# Patient Record
Sex: MALE | Race: WHITE | Employment: OTHER | URBAN - METROPOLITAN AREA
[De-identification: names, ages, dates, MRNs, and addresses within clinical notes are randomized per-mention and may not be internally consistent; named-entity substitution may affect disease eponyms.]

---

## 2017-03-02 DIAGNOSIS — E78.5 DYSLIPIDEMIA, GOAL LDL BELOW 70: Primary | ICD-10-CM

## 2017-03-02 DIAGNOSIS — I07.1 TRICUSPID VALVE INSUFFICIENCY, UNSPECIFIED ETIOLOGY: ICD-10-CM

## 2017-03-02 DIAGNOSIS — I25.10 ATHEROSCLEROSIS OF NATIVE CORONARY ARTERY OF NATIVE HEART WITHOUT ANGINA PECTORIS: ICD-10-CM

## 2017-03-02 DIAGNOSIS — R01.1 SYSTOLIC EJECTION MURMUR: ICD-10-CM

## 2017-03-08 ENCOUNTER — HOSPITAL ENCOUNTER (OUTPATIENT)
Dept: LAB | Age: 67
Discharge: HOME | End: 2017-03-08
Payer: MEDICARE

## 2017-03-08 PROCEDURE — 36415 COLL VENOUS BLD VENIPUNCTURE: CPT

## 2017-03-08 PROCEDURE — 83036 HEMOGLOBIN GLYCOSYLATED A1C: CPT

## 2017-03-08 PROCEDURE — 83704 LIPOPROTEIN BLD QUAN PART: CPT

## 2017-03-08 PROCEDURE — 80053 COMPREHEN METABOLIC PANEL: CPT

## 2017-03-08 PROCEDURE — 83735 ASSAY OF MAGNESIUM: CPT

## 2017-03-09 LAB
ALBUMIN SERPL-MCNC: 4.8 G/DL (ref 3.6–4.8)
ALBUMIN/GLOB SERPL: 2.1 {RATIO} (ref 1.1–2.5)
ALP SERPL-CCNC: 117 IU/L (ref 39–117)
ALT SERPL-CCNC: 24 IU/L (ref 0–44)
AST SERPL-CCNC: 26 IU/L (ref 0–40)
BILIRUB SERPL-MCNC: 0.5 MG/DL (ref 0–1.2)
BUN SERPL-MCNC: 13 MG/DL (ref 8–27)
BUN/CREAT SERPL: 12 (ref 10–22)
CALCIUM SERPL-MCNC: 9.4 MG/DL (ref 8.6–10.2)
CHLORIDE SERPL-SCNC: 103 MMOL/L (ref 96–106)
CHOLEST SERPL-MCNC: 174 MG/DL (ref 100–199)
CO2 SERPL-SCNC: 26 MMOL/L (ref 18–29)
CREAT SERPL-MCNC: 1.05 MG/DL (ref 0.76–1.27)
EST. AVERAGE GLUCOSE BLD GHB EST-MCNC: 108 MG/DL
GLOBULIN SER CALC-MCNC: 2.3 G/DL (ref 1.5–4.5)
GLUCOSE SERPL-MCNC: 99 MG/DL (ref 65–99)
HBA1C MFR BLD: 5.4 % (ref 4.8–5.6)
HDL SERPL-SCNC: 31 UMOL/L
HDLC SERPL-MCNC: 50 MG/DL
INTERPRETATION, 910389: NORMAL
LDL SERPL QN: 21.6 NM
LDL SERPL-SCNC: 1258 NMOL/L
LDL SMALL SERPL-SCNC: 344 NMOL/L
LDLC SERPL CALC-MCNC: 103 MG/DL (ref 0–99)
LP-IR SCORE SERPL: <25
MAGNESIUM SERPL-MCNC: 2 MG/DL (ref 1.6–2.3)
POTASSIUM SERPL-SCNC: 4.2 MMOL/L (ref 3.5–5.2)
PROT SERPL-MCNC: 7.1 G/DL (ref 6–8.5)
SODIUM SERPL-SCNC: 144 MMOL/L (ref 134–144)
TRIGL SERPL-MCNC: 104 MG/DL (ref 0–149)

## 2017-03-31 ENCOUNTER — OFFICE VISIT (OUTPATIENT)
Dept: CARDIOLOGY CLINIC | Age: 67
End: 2017-03-31

## 2017-03-31 VITALS
RESPIRATION RATE: 16 BRPM | WEIGHT: 153 LBS | HEIGHT: 68 IN | SYSTOLIC BLOOD PRESSURE: 124 MMHG | DIASTOLIC BLOOD PRESSURE: 62 MMHG | HEART RATE: 64 BPM | BODY MASS INDEX: 23.19 KG/M2

## 2017-03-31 DIAGNOSIS — I25.10 ATHEROSCLEROSIS OF NATIVE CORONARY ARTERY OF NATIVE HEART WITHOUT ANGINA PECTORIS: Primary | ICD-10-CM

## 2017-03-31 DIAGNOSIS — E78.5 DYSLIPIDEMIA, GOAL LDL BELOW 70: ICD-10-CM

## 2017-03-31 DIAGNOSIS — Z95.1 S/P CABG X 3: ICD-10-CM

## 2017-03-31 DIAGNOSIS — I07.1 TRICUSPID VALVE INSUFFICIENCY, UNSPECIFIED ETIOLOGY: ICD-10-CM

## 2017-03-31 NOTE — PROGRESS NOTES
[facility] [address]: Coletta Krabbe  [phone]  Pronounced \"sir-lakesha\"    HPI: Bruce Hughes, a 73y.o. year-old who presents for follow up regarding his CAD. He is feeling well and working out regularly. Lasts lipid a little worse, , keep working on it, would not change meds at this point, given risk of side effects with all of his other meds. Jogs 3 miles/day 6 days/week, no activity restrictions  Weight down 8 lbs but he is working on weight loss  -he has lost weight due to the stress of building the long-term home on [address]. He was having trouble with anxiety and depression and started Zoloft and that was awful for him, he could not eat and felt very poorly. The anxiety actually got much worse and is now of of it and starting to do better. Denies palpitations, chest pain, dyspnea  Still has lightheadedness when standing but not that often, denies syncope  No LE edema  He still feels very tender over the left side of his chest after his surgery  Retired but still doing some consulting  Still some after effects of aching in the chest postop. EKG is NST with anterior abnormalities    Assessment/Plan:  1. CAD- stable, 3 vessel CABG x 3 LIMA to LAD, RSVG to OM, RSVG to PDA 6/4/13 Dr. Max Hilton, on ASA and Livalo, no beta blocker secondary to dizziness/bradycardia, follow up in 3 months  2. Dyslipidemia- Lipids up a bit, continue Livalo and Zetia   - FDA recommends Livalvo (FDA recommendations scanned into [facility]) for lipid therapy with concurrent HIV therapy  3. NSTEMI- no ACEI/bblocker secondary to hypotension/dizziness, continue ASA and statin  4. HIV- followed by Dr. Elsa Russell, on Atripla   5. DVT- at GSV harvest site 7/13, gone on ultrasound after 6 months of coumadin therapy. 6. Borderline DM- last A1c under 6, followed by Dr. Sharonda Valle  7. Valvular heart disease- mild MR/TE/PI, followup echo for change in sx  8. Hypotension- stable now stay hydrated  9.  Dyslipidemia- discussed his ldlp of 1258 and low tg and ldl 103, discussed pcsk9 and will defer for now. 7/15 EF 60% mild TR/MR/PI  8/15 Nuc normal EF 60%  Venous duplex 7/10/13: DVT in GSV bilaterally, in the right extending into the CFV  CABG 6/4/13 - LIMA to LAD, RSVG to OM, RSVG to PDA  TTE 5/13 - LVEF 45 %, dyskinesis of the basal-mid anteroseptal and apical wall(s), trivial TR    Soc no tob no etoh  Fhx no early cad    He  has a past medical history of CAD (coronary artery disease); GERD (gastroesophageal reflux disease); Hiatal hernia; HIV (human immunodeficiency virus infection) ([facility] [address] and Hyperlipidemia. Cardiovascular ROS: negative for chest pain, dyspnea  Respiratory ROS: negative for - cough or hemoptysis  Neurological ROS: no TIA or stroke symptoms  All other systems negative except as above. PE  Vitals:    03/31/17 1127   BP: 124/62   Pulse: 64   Resp: 16   Weight: 153 lb (69.4 kg)   Height: 5' 8\" (1.727 m)    Body mass index is 23.26 kg/(m^2).    General appearance - alert, well appearing, and in no distress  Mental status - affect appropriate to mood  Eyes - sclera anicteric, moist mucous membranes  Neck - supple, no significant adenopathy  Lymphatics - not assessed   Chest - clear to auscultation, no wheezes, rales or rhonchi  Heart - normal rate, regular rhythm, normal S1, S2, 1-2/6 KESHAWN   Abdomen - soft, nontender, nondistended, no masses or organomegaly  Back exam - full range of motion, no tenderness  Neurological - cranial nerves II through XII grossly intact, no focal deficit  Musculoskeletal - no muscular tenderness noted, normal strength  Extremities - peripheral pulses normal, no pedal edema  Skin - normal coloration  no rashes    12 lead ECG: sinus bradycardia VR 53bpm     Recent Labs:  Lab Results   Component Value Date/Time    Cholesterol, total 176 01/11/2016 09:37 AM    Cholesterol, Total 174 03/08/2017 09:29 AM    HDL Cholesterol 47 01/11/2016 09:37 AM    LDL, calculated 103 01/11/2016 09:37 AM Triglyceride 130 01/11/2016 09:37 AM    CHOL/HDL Ratio 6.6 05/30/2013 04:00 AM     Lab Results   Component Value Date/Time    Creatinine 1.05 03/08/2017 09:29 AM     Lab Results   Component Value Date/Time    BUN 13 03/08/2017 09:29 AM     Lab Results   Component Value Date/Time    Potassium 4.2 03/08/2017 09:29 AM     Lab Results   Component Value Date/Time    Hemoglobin A1c 5.4 03/08/2017 09:29 AM     Lab Results   Component Value Date/Time    HGB 13.4 01/11/2016 09:37 AM     Lab Results   Component Value Date/Time    PLATELET 028 20/20/1320 09:37 AM       Reviewed:  Past Medical History:   Diagnosis Date    CAD (coronary artery disease)     GERD (gastroesophageal reflux disease)     Hiatal hernia     [facility] infection) ([facility] [address]     Hyperlipidemia      History   Smoking Status    Never Smoker   Smokeless Tobacco    Never Used     History   Alcohol Use    0.0 oz/week    0 Standard drinks or equivalent per week     Comment: Moderate use     No Known Allergies    Current Outpatient Prescriptions   Medication Sig    ZETIA 10 mg tablet TAKE 1 TABLET DAILY    LIVALO 4 mg tab tablet TAKE 1 TABLET DAILY    aspirin 81 mg chewable tablet Take 1 tablet by mouth daily.  fexofenadine (ALLEGRA) 180 mg tablet Take 180 mg by mouth daily as needed.  calcium 500 mg Tab Take 1 Tab by mouth daily.  albuterol (PROVENTIL HFA) 90 mcg/actuation inhaler Take 1 Puff by inhalation daily as needed.  cetirizine (ZYRTEC) 10 mg tablet Take  by mouth daily as needed.  efavirenz-emtrictabine-tenofovir (ATRIPLA) tablet Take 1 Tab by mouth nightly.  multivitamin (MULTI-DEYLIN) Liqd Take 5 mL by mouth daily. No current facility-administered medications for this visit.         [facility] [place]  [address], [address]

## 2017-03-31 NOTE — MR AVS SNAPSHOT
Visit Information Date & Time Provider Department Dept. Phone Encounter #  
 3/31/2017 11:00 AM Naman Dotson MD [facility] Bruce Lieberman [phone] [id number] Upcoming Health Maintenance Date Due Hepatitis C Screening 1950 DTaP/Tdap/Td series (1 - Tdap) 9/22/1972 FOBT Q 1 YEAR AGE 50-75 9/22/2001 ZOSTER VACCINE AGE 60> 9/22/2011 INFLUENZA AGE 9 TO ADULT 8/1/2016 GLAUCOMA SCREENING Q2Y 9/22/2016 Pneumococcal 65+ High/Highest Risk (2 of 2 - PPSV23) 9/22/2016 MEDICARE YEARLY EXAM 9/22/2016 Allergies as of 3/31/2017  Review Complete On: 3/31/2017 By: Ayush Sellers No Known Allergies Current Immunizations  Reviewed on 6/24/2013 Name Date Influenza Vaccine 10/1/2012 Pneumococcal Vaccine (Unspecified Type) 6/1/2006 Not reviewed this visit You Were Diagnosed With   
  
 Codes Comments Atherosclerosis of native coronary artery of native heart without angina pectoris    -  Primary ICD-10-CM: I25.10 ICD-9-CM: 414.01 Tricuspid valve insufficiency, unspecified etiology     ICD-10-CM: I07.1 ICD-9-CM: 397.0 Dyslipidemia, goal LDL below 70     ICD-10-CM: E78.5 ICD-9-CM: 272.4 S/P CABG x 3     ICD-10-CM: Z95.1 ICD-9-CM: V45.81 Vitals BP Pulse Resp Height(growth percentile) Weight(growth percentile) BMI  
 124/62 (BP 1 Location: Right arm, BP Patient Position: Sitting) 64 16 5' 8\" (1.727 m) 153 lb (69.4 kg) 23.26 kg/m2 Smoking Status Never Smoker Vitals History BMI and BSA Data Body Mass Index Body Surface Area  
 23.26 kg/m 2 1.82 m 2 Preferred Pharmacy Pharmacy Name Phone [facility] [phone] Your Updated Medication List  
  
   
This list is accurate as of: 3/31/17 11:51 AM.  Always use your most recent med list. ALLEGRA 180 mg tablet Generic drug:  fexofenadine Take 180 mg by mouth daily as needed. aspirin 81 mg chewable tablet Take 1 tablet by mouth daily. ATRIPLA tablet Generic drug:  efavirenz-emtrictabine-tenofovir Take 1 Tab by mouth nightly. calcium 500 mg Tab Take 1 Tab by mouth daily. cetirizine 10 mg tablet Commonly known as:  ZYRTEC Take  by mouth daily as needed. LIVALO 4 mg Tab tablet Generic drug:  pitavastatin TAKE 1 TABLET DAILY  
  
 multivitamin Liqd Commonly known as:  Unknown Born Take 5 mL by mouth daily. PROVENTIL HFA 90 mcg/actuation inhaler Generic drug:  albuterol Take 1 Puff by inhalation daily as needed. ZETIA 10 mg tablet Generic drug:  ezetimibe TAKE 1 TABLET DAILY Introducing [facility]! Dear Anuradha Duarte: Thank you for requesting a [employer] account. Our records indicate that you already have an active [employer] account. You can access your account anytime at [email or web address] Did you know that you can access your hospital and ER discharge instructions at any time in [employer]? You can also review all of your test results from your hospital stay or ER visit. Additional Information If you have questions, please visit the Frequently Asked Questions section of the [employer] website at [email or web address] Remember, [employer] is NOT to be used for urgent needs. For medical emergencies, dial 911. Now available from your iPhone and Android! Please provide this summary of care documentation to your next provider. Your primary care clinician is listed as Fran Gonzalez. If you have any questions after today's visit, please call [phone].

## 2017-07-11 RX ORDER — PITAVASTATIN CALCIUM 4.18 MG/1
TABLET, FILM COATED ORAL
Qty: 90 TAB | Refills: 2 | Status: SHIPPED | OUTPATIENT
Start: 2017-07-11 | End: 2018-03-31 | Stop reason: SDUPTHER

## 2017-11-03 ENCOUNTER — TELEPHONE (OUTPATIENT)
Dept: CARDIOLOGY CLINIC | Age: 67
End: 2017-11-03

## 2017-11-03 DIAGNOSIS — I25.10 ATHEROSCLEROSIS OF NATIVE CORONARY ARTERY OF NATIVE HEART WITHOUT ANGINA PECTORIS: Primary | ICD-10-CM

## 2017-11-03 DIAGNOSIS — E78.5 DYSLIPIDEMIA, GOAL LDL BELOW 70: ICD-10-CM

## 2017-11-03 DIAGNOSIS — R01.1 SYSTOLIC EJECTION MURMUR: ICD-10-CM

## 2017-11-03 NOTE — TELEPHONE ENCOUNTER
Pt asks that a new lab order be sent to his address of    [address] [place] [address] Chari MALDONADO, [address] [place] [address]      Thank you,  Jose Alberto Mitchell

## 2018-03-12 ENCOUNTER — HOSPITAL ENCOUNTER (OUTPATIENT)
Dept: LAB | Age: 68
Discharge: HOME | End: 2018-03-12
Payer: MEDICARE

## 2018-03-12 ENCOUNTER — TELEPHONE (OUTPATIENT)
Dept: CARDIOLOGY CLINIC | Age: 68
End: 2018-03-12

## 2018-03-12 PROCEDURE — 83735 ASSAY OF MAGNESIUM: CPT

## 2018-03-12 PROCEDURE — 83704 LIPOPROTEIN BLD QUAN PART: CPT

## 2018-03-12 PROCEDURE — 36415 COLL VENOUS BLD VENIPUNCTURE: CPT

## 2018-03-12 PROCEDURE — 83036 HEMOGLOBIN GLYCOSYLATED A1C: CPT

## 2018-03-12 PROCEDURE — 80053 COMPREHEN METABOLIC PANEL: CPT

## 2018-03-12 NOTE — TELEPHONE ENCOUNTER
Returned call to patient. Verified identity. Informed patient that his lab slip is ready to be picked up.

## 2018-03-12 NOTE — TELEPHONE ENCOUNTER
Patient would like to come by and  his paperwork for his labs. He stated that he is going to have them done today and misplaced the orders. Patient stated that he would like to come by and pick those up.   Phone [phone]

## 2018-03-13 LAB
ALBUMIN SERPL-MCNC: 4.3 G/DL (ref 3.6–4.8)
ALBUMIN/GLOB SERPL: 1.9 {RATIO} (ref 1.2–2.2)
ALP SERPL-CCNC: 103 IU/L (ref 39–117)
ALT SERPL-CCNC: 24 IU/L (ref 0–44)
AST SERPL-CCNC: 29 IU/L (ref 0–40)
BILIRUB SERPL-MCNC: 0.5 MG/DL (ref 0–1.2)
BUN SERPL-MCNC: 17 MG/DL (ref 8–27)
BUN/CREAT SERPL: 14 (ref 10–24)
CALCIUM SERPL-MCNC: 9.2 MG/DL (ref 8.6–10.2)
CHLORIDE SERPL-SCNC: 100 MMOL/L (ref 96–106)
CHOLEST SERPL-MCNC: 182 MG/DL (ref 100–199)
CO2 SERPL-SCNC: 25 MMOL/L (ref 18–29)
CREAT SERPL-MCNC: 1.19 MG/DL (ref 0.76–1.27)
EST. AVERAGE GLUCOSE BLD GHB EST-MCNC: 108 MG/DL
GFR SERPLBLD CREATININE-BSD FMLA CKD-EPI: 63 ML/MIN/1.73
GFR SERPLBLD CREATININE-BSD FMLA CKD-EPI: 73 ML/MIN/1.73
GLOBULIN SER CALC-MCNC: 2.3 G/DL (ref 1.5–4.5)
GLUCOSE SERPL-MCNC: 99 MG/DL (ref 65–99)
HBA1C MFR BLD: 5.4 % (ref 4.8–5.6)
HDL SERPL-SCNC: 29.6 UMOL/L
HDLC SERPL-MCNC: 51 MG/DL
INTERPRETATION, 910389: NORMAL
LDL SERPL QN: 21.8 NM
LDL SERPL-SCNC: 1326 NMOL/L
LDL SMALL SERPL-SCNC: 279 NMOL/L
LDLC SERPL CALC-MCNC: 109 MG/DL (ref 0–99)
LP-IR SCORE SERPL: <25
MAGNESIUM SERPL-MCNC: 2.4 MG/DL (ref 1.6–2.3)
POTASSIUM SERPL-SCNC: 4 MMOL/L (ref 3.5–5.2)
PROT SERPL-MCNC: 6.6 G/DL (ref 6–8.5)
SODIUM SERPL-SCNC: 140 MMOL/L (ref 134–144)
TRIGL SERPL-MCNC: 108 MG/DL (ref 0–149)

## 2018-03-20 ENCOUNTER — TELEPHONE (OUTPATIENT)
Dept: CARDIOLOGY CLINIC | Age: 68
End: 2018-03-20

## 2018-03-20 NOTE — TELEPHONE ENCOUNTER
Called patient. Verified identity. Informed him of the following information per Dara Bourne. NP. Advised patient to look into the medication and check with his insurance to see if it would be covered. Patient acknowledged understanding.

## 2018-03-20 NOTE — TELEPHONE ENCOUNTER
LDL and LDL-P is still too high with his hx of CAD. He has a follow up visit next month so let him know that we will be discussing Praluent injections for his cholesterol with him at that visit. If he sounds interested in pursuing Praluent injections please ask him to check with his insurance company regarding coverage of this medication and then we will discuss at his office visit next month.

## 2018-03-20 NOTE — TELEPHONE ENCOUNTER
----- Message from Chuck Brewster NP sent at 3/20/2018 10:58 AM EDT -----  LDL and LDL-P is still too high with his hx of CAD. He has a follow up visit next month so let him know that we will be discussing Praluent injections for his cholesterol with him at that visit. If he sounds interested in pursuing Praluent injections please ask him to check with his insurance company regarding coverage of this medication and then we will discuss at his office visit next month.

## 2018-04-03 RX ORDER — PITAVASTATIN CALCIUM 4.18 MG/1
TABLET, FILM COATED ORAL
Qty: 90 TAB | Refills: 2 | Status: SHIPPED | OUTPATIENT
Start: 2018-04-03 | End: 2018-12-30 | Stop reason: SDUPTHER

## 2018-04-12 ENCOUNTER — OFFICE VISIT (OUTPATIENT)
Dept: CARDIOLOGY CLINIC | Age: 68
End: 2018-04-12

## 2018-04-12 VITALS
WEIGHT: 150.4 LBS | HEIGHT: 68 IN | DIASTOLIC BLOOD PRESSURE: 74 MMHG | BODY MASS INDEX: 22.79 KG/M2 | HEART RATE: 58 BPM | SYSTOLIC BLOOD PRESSURE: 120 MMHG | RESPIRATION RATE: 16 BRPM

## 2018-04-12 DIAGNOSIS — I07.1 TRICUSPID VALVE INSUFFICIENCY, UNSPECIFIED ETIOLOGY: ICD-10-CM

## 2018-04-12 DIAGNOSIS — I34.0 NON-RHEUMATIC MITRAL REGURGITATION: ICD-10-CM

## 2018-04-12 DIAGNOSIS — E78.5 DYSLIPIDEMIA, GOAL LDL BELOW 70: ICD-10-CM

## 2018-04-12 DIAGNOSIS — Z95.1 S/P CABG X 3: ICD-10-CM

## 2018-04-12 DIAGNOSIS — I25.10 ATHEROSCLEROSIS OF NATIVE CORONARY ARTERY OF NATIVE HEART WITHOUT ANGINA PECTORIS: Primary | ICD-10-CM

## 2018-04-12 NOTE — PROGRESS NOTES
[facility] [address]: Burak Ortiz  [phone]  Pronounced \"sir-lakesha\"    HPI: Ranjan Monte, a 78y.o. year-old who presents for follow up regarding his CAD. 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 CAD- stable, 3 vessel CABG x 3 LIMA to LAD, RSVG to OM, RSVG to PDA 6/4/13 Dr. Nessa Finn, last stress test in 2015 so will proceed will stress test to assess for ischemia  -continue on ASA and Livalo, no beta blocker secondary to dizziness/bradycardia  -follow up in 1 year   2. Dyslipidemia- , LDL-P 1326, on Livalo 4mg dailly and Zetia 10mg daily   - FDA recommends Livalvo (FDA recommendations scanned into [facility]) for lipid therapy with concurrent HIV therapy  - discussed PCSK9 inhibitors but he is not willing to start a new medication at this time   3. NSTEMI- no ACEI/bblocker secondary to hypotension/dizziness, continue ASA and statin  4. HIV- followed by Dr. Yoana Venegas, on Atripla   5. DVT- at GSV harvest site 7/13, gone on ultrasound after 6 months of coumadin therapy. 6. Valvular heart disease- mild MR/TR/PI, will order TTE to reassess   7.  Hypotension- stable now stay hydrated    7/15 EF 60% mild TR/MR/PI  8/15 Nuc normal EF 60%  Venous duplex 7/10/13: DVT in GSV bilaterally, in the right extending into the CFV  CABG 6/4/13 - LIMA to LAD, RSVG to OM, RSVG to PDA  TTE 5/13 - LVEF 45 %, dyskinesis of the basal-mid anteroseptal and apical wall(s), trivial TR    Soc no tob no etoh  Fhx no early cad    He  has a past medical history of CAD (coronary artery disease); GERD (gastroesophageal reflux disease); Hiatal hernia; HIV (human immunodeficiency virus infection) ([facility] [address] and Hyperlipidemia. Cardiovascular ROS: negative for chest pain, dyspnea  Respiratory ROS: negative for - cough or hemoptysis  Neurological ROS: no TIA or stroke symptoms  All other systems negative except as above. PE  Vitals:    04/12/18 1130   BP: 120/74   Pulse: (!) 58   Resp: 16   Weight: 150 lb 6.4 oz (68.2 kg)   Height: 5' 8\" (1.727 m)    Body mass index is 22.87 kg/(m^2).    General appearance - alert, well appearing, and in no distress  Mental status - affect appropriate to mood  Eyes - sclera anicteric, moist mucous membranes  Neck - supple, no significant adenopathy  Lymphatics - not assessed   Chest - clear to auscultation, no wheezes, rales or rhonchi  Heart - normal rate, regular rhythm, normal S1, S2, 2/6 KESHAWN   Abdomen - soft, nontender, nondistended, no masses or organomegaly  Back exam - full range of motion, no tenderness  Neurological - cranial nerves II through XII grossly intact, no focal deficit  Musculoskeletal - no muscular tenderness noted, normal strength  Extremities - peripheral pulses normal, no pedal edema  Skin - normal coloration  no rashes    12 lead ECG: sinus bradycardia VR 58bpm     Recent Labs:  Lab Results   Component Value Date/Time    Cholesterol, total 176 01/11/2016 09:37 AM    Cholesterol, Total 182 03/12/2018 11:44 AM    HDL Cholesterol 47 01/11/2016 09:37 AM    LDL, calculated 103 (H) 01/11/2016 09:37 AM    Triglyceride 130 01/11/2016 09:37 AM    CHOL/HDL Ratio 6.6 (H) 05/30/2013 04:00 AM     Lab Results   Component Value Date/Time    Creatinine 1.19 03/12/2018 11:44 AM     Lab Results   Component Value Date/Time    BUN 17 03/12/2018 11:44 AM     Lab Results   Component Value Date/Time    Potassium 4.0 03/12/2018 11:44 AM     Lab Results   Component Value Date/Time    Hemoglobin A1c 5.4 03/12/2018 11:44 AM     Lab Results   Component Value Date/Time    HGB 13.4 01/11/2016 09:37 AM     Lab Results   Component Value Date/Time    PLATELET 465 21/41/1794 09:37 AM       Reviewed:  Past Medical History:   Diagnosis Date    CAD (coronary artery disease)     GERD (gastroesophageal reflux disease)     Hiatal hernia     [facility] infection) ([facility] [address]     Hyperlipidemia      History   Smoking Status    Never Smoker   Smokeless Tobacco    Never Used     History   Alcohol Use    0.0 oz/week    0 Standard drinks or equivalent per week     Comment: Moderate use     No Known Allergies    Current Outpatient Prescriptions   Medication Sig    LIVALO 4 mg tab tablet TAKE 1 TABLET DAILY    ezetimibe (ZETIA) 10 mg tablet TAKE 1 TABLET DAILY    aspirin 81 mg chewable tablet Take 1 tablet by mouth daily.  fexofenadine (ALLEGRA) 180 mg tablet Take 180 mg by mouth daily as needed.  calcium 500 mg Tab Take 1 Tab by mouth daily.  albuterol (PROVENTIL HFA) 90 mcg/actuation inhaler Take 1 Puff by inhalation daily as needed.  cetirizine (ZYRTEC) 10 mg tablet Take  by mouth daily as needed.  efavirenz-emtrictabine-tenofovir (ATRIPLA) tablet Take 1 Tab by mouth nightly.  multivitamin (MULTI-DEYLIN) Liqd Take 5 mL by mouth daily. No current facility-administered medications for this visit.       [employer] [facility] [place]  [address], [address]

## 2018-04-23 ENCOUNTER — CLINICAL SUPPORT (OUTPATIENT)
Dept: CARDIOLOGY CLINIC | Age: 68
End: 2018-04-23

## 2018-04-23 DIAGNOSIS — I25.10 ATHEROSCLEROSIS OF NATIVE CORONARY ARTERY OF NATIVE HEART WITHOUT ANGINA PECTORIS: Primary | ICD-10-CM

## 2018-04-23 DIAGNOSIS — Z95.1 S/P CABG X 3: ICD-10-CM

## 2018-04-23 DIAGNOSIS — R01.1 SYSTOLIC EJECTION MURMUR: ICD-10-CM

## 2018-04-23 DIAGNOSIS — I25.2 PERSONAL HISTORY OF MI (MYOCARDIAL INFARCTION): ICD-10-CM

## 2018-04-23 DIAGNOSIS — I36.1 NON-RHEUMATIC TRICUSPID VALVE INSUFFICIENCY: ICD-10-CM

## 2018-04-23 DIAGNOSIS — I34.0 NON-RHEUMATIC MITRAL REGURGITATION: ICD-10-CM

## 2018-04-23 DIAGNOSIS — I37.1 NONRHEUMATIC PULMONARY VALVE INSUFFICIENCY: Primary | ICD-10-CM

## 2018-04-23 DIAGNOSIS — E78.5 DYSLIPIDEMIA, GOAL LDL BELOW 70: ICD-10-CM

## 2018-04-23 DIAGNOSIS — Z95.1 HX OF CABG: ICD-10-CM

## 2018-04-23 DIAGNOSIS — I07.1 TRICUSPID VALVE INSUFFICIENCY, UNSPECIFIED ETIOLOGY: ICD-10-CM

## 2018-04-23 NOTE — PROGRESS NOTES
See scanned document. Ordering Doctor:Dr. Keven Ball  Reading Doctor:Dr. Keven Ball    Patient tolerated procedure well.

## 2018-04-30 ENCOUNTER — TELEPHONE (OUTPATIENT)
Dept: CARDIOLOGY CLINIC | Age: 68
End: 2018-04-30

## 2018-04-30 NOTE — TELEPHONE ENCOUNTER
----- Message from Anne Morgan MD sent at 4/26/2018  8:20 AM EDT -----  Needs echo to confirm EF, no new blockages      Nuclear: 4/18    Impression:   1.  Exercise capacity is excellent. 2.  Exercise treadmill test is normal at an optimal workload. 3.  Myocardial perfusion imaging is normal.   4.  Overall left ventricular systolic function is normal.   5.  Left ventricular systolic function is normal. LVEF 33%. , This low Ef is likley to be artifactual but needs to be verified with echo. 6.  Normal Exercise Gated SPECT myocardial perfusion study. These test results indicate low likelihood for the presence of angiographically significant CAD.         Echo: 4/18 EF 59%, WNL    LVM for patient to return call at earliest convenience. Patient returned call and above test results given.   2 pt identifiers used      Future Appointments  Date Time Provider Kae Spicer   4/25/2019 11:40 AM Anne Morgan  [address]

## 2018-12-31 RX ORDER — EZETIMIBE 10 MG/1
TABLET ORAL
Qty: 90 TAB | Refills: 3 | Status: SHIPPED | OUTPATIENT
Start: 2018-12-31 | End: 2020-02-26

## 2019-01-02 RX ORDER — PITAVASTATIN CALCIUM 4.18 MG/1
TABLET, FILM COATED ORAL
Qty: 90 TAB | Refills: 2 | Status: SHIPPED | OUTPATIENT
Start: 2019-01-02 | End: 2019-09-15 | Stop reason: SDUPTHER

## 2019-01-02 NOTE — TELEPHONE ENCOUNTER
Requested Prescriptions     Signed Prescriptions Disp Refills    LIVALO 4 mg tab tablet 90 Tab 2     Sig: TAKE 1 TABLET DAILY     Authorizing Provider: Felecia Friday     Ordering User: Quique Perez     Per verbal orders

## 2019-03-28 ENCOUNTER — PATIENT MESSAGE (OUTPATIENT)
Dept: CARDIOLOGY CLINIC | Age: 69
End: 2019-03-28

## 2019-03-29 DIAGNOSIS — E78.5 DYSLIPIDEMIA, GOAL LDL BELOW 70: ICD-10-CM

## 2019-03-29 DIAGNOSIS — I25.10 ATHEROSCLEROSIS OF NATIVE CORONARY ARTERY OF NATIVE HEART WITHOUT ANGINA PECTORIS: Primary | ICD-10-CM

## 2019-04-11 ENCOUNTER — HOSPITAL ENCOUNTER (OUTPATIENT)
Dept: LAB | Age: 69
Discharge: HOME | End: 2019-04-11
Payer: MEDICARE

## 2019-04-11 PROCEDURE — 85027 COMPLETE CBC AUTOMATED: CPT

## 2019-04-11 PROCEDURE — 36415 COLL VENOUS BLD VENIPUNCTURE: CPT

## 2019-04-11 PROCEDURE — 80053 COMPREHEN METABOLIC PANEL: CPT

## 2019-04-11 PROCEDURE — 83735 ASSAY OF MAGNESIUM: CPT

## 2019-04-11 PROCEDURE — 82306 VITAMIN D 25 HYDROXY: CPT

## 2019-04-11 PROCEDURE — 83704 LIPOPROTEIN BLD QUAN PART: CPT

## 2019-04-12 LAB
25(OH)D3+25(OH)D2 SERPL-MCNC: 29.5 NG/ML (ref 30–100)
ALBUMIN SERPL-MCNC: 4.8 G/DL (ref 3.6–4.8)
ALBUMIN/GLOB SERPL: 2.1 {RATIO} (ref 1.2–2.2)
ALP SERPL-CCNC: 124 IU/L (ref 39–117)
ALT SERPL-CCNC: 28 IU/L (ref 0–44)
AST SERPL-CCNC: 34 IU/L (ref 0–40)
BILIRUB SERPL-MCNC: 0.4 MG/DL (ref 0–1.2)
BUN SERPL-MCNC: 19 MG/DL (ref 8–27)
BUN/CREAT SERPL: 17 (ref 10–24)
CALCIUM SERPL-MCNC: 9.2 MG/DL (ref 8.6–10.2)
CHLORIDE SERPL-SCNC: 104 MMOL/L (ref 96–106)
CHOLEST SERPL-MCNC: 209 MG/DL (ref 100–199)
CO2 SERPL-SCNC: 26 MMOL/L (ref 20–29)
CREAT SERPL-MCNC: 1.14 MG/DL (ref 0.76–1.27)
ERYTHROCYTE [DISTWIDTH] IN BLOOD BY AUTOMATED COUNT: 12.9 % (ref 12.3–15.4)
GLOBULIN SER CALC-MCNC: 2.3 G/DL (ref 1.5–4.5)
GLUCOSE SERPL-MCNC: 94 MG/DL (ref 65–99)
HCT VFR BLD AUTO: 43.2 % (ref 37.5–51)
HDL SERPL-SCNC: 35.2 UMOL/L
HDLC SERPL-MCNC: 65 MG/DL
HGB BLD-MCNC: 14.1 G/DL (ref 13–17.7)
INTERPRETATION, 910389: NORMAL
LDL SERPL QN: 21.2 NM
LDL SERPL-SCNC: 1413 NMOL/L
LDL SMALL SERPL-SCNC: 542 NMOL/L
LDLC SERPL CALC-MCNC: 122 MG/DL (ref 0–99)
LP-IR SCORE SERPL: 32
MAGNESIUM SERPL-MCNC: 2.4 MG/DL (ref 1.6–2.3)
MCH RBC QN AUTO: 32.3 PG (ref 26.6–33)
MCHC RBC AUTO-ENTMCNC: 32.6 G/DL (ref 31.5–35.7)
MCV RBC AUTO: 99 FL (ref 79–97)
PLATELET # BLD AUTO: 191 X10E3/UL (ref 150–379)
POTASSIUM SERPL-SCNC: 4.6 MMOL/L (ref 3.5–5.2)
PROT SERPL-MCNC: 7.1 G/DL (ref 6–8.5)
RBC # BLD AUTO: 4.37 X10E6/UL (ref 4.14–5.8)
SODIUM SERPL-SCNC: 143 MMOL/L (ref 134–144)
TRIGL SERPL-MCNC: 108 MG/DL (ref 0–149)
WBC # BLD AUTO: 6.2 X10E3/UL (ref 3.4–10.8)

## 2019-04-25 ENCOUNTER — OFFICE VISIT (OUTPATIENT)
Dept: CARDIOLOGY CLINIC | Age: 69
End: 2019-04-25

## 2019-04-25 VITALS
HEIGHT: 68 IN | RESPIRATION RATE: 16 BRPM | WEIGHT: 156.4 LBS | OXYGEN SATURATION: 98 % | BODY MASS INDEX: 23.7 KG/M2 | HEART RATE: 68 BPM | DIASTOLIC BLOOD PRESSURE: 80 MMHG | SYSTOLIC BLOOD PRESSURE: 120 MMHG

## 2019-04-25 DIAGNOSIS — E78.5 DYSLIPIDEMIA, GOAL LDL BELOW 70: ICD-10-CM

## 2019-04-25 DIAGNOSIS — I25.10 ATHEROSCLEROSIS OF NATIVE CORONARY ARTERY OF NATIVE HEART WITHOUT ANGINA PECTORIS: ICD-10-CM

## 2019-04-25 DIAGNOSIS — Z95.1 HX OF CABG: ICD-10-CM

## 2019-04-25 DIAGNOSIS — I07.1 TRICUSPID VALVE INSUFFICIENCY, UNSPECIFIED ETIOLOGY: ICD-10-CM

## 2019-04-25 DIAGNOSIS — I25.119 ATHEROSCLEROSIS OF NATIVE CORONARY ARTERY OF NATIVE HEART WITH ANGINA PECTORIS (HCC): Primary | ICD-10-CM

## 2019-04-25 DIAGNOSIS — I36.1 NON-RHEUMATIC TRICUSPID VALVE INSUFFICIENCY: ICD-10-CM

## 2019-04-25 DIAGNOSIS — Z95.1 S/P CABG X 3: ICD-10-CM

## 2019-04-25 DIAGNOSIS — R01.1 SYSTOLIC EJECTION MURMUR: ICD-10-CM

## 2019-04-25 NOTE — PROGRESS NOTES
[facility] [address]: Yessenia Richards  [phone]  Pronounced \"sir-gent\"    HPI: Marc Rodríguez, a 80y.o. year-old who presents for follow up regarding his CAD. Just got labs and LDL is higher than it has been, still not at goal of LDL <70 and LDLp <1000. He is exercising daily, doing well, fells well and not chest pain and no dyspnea, eating ok but poor appetite and struggling with hs anxiety, etc. Going to therapy but still battling it. Used to take fish oil, changed to flaxseed and using that daily. So overall not much room for improvement in lifestyle and we discussed additional medicine considerations. He will look in to [address] [place] [address], [facility] cost and hopefully it is a possibility - it was not feasible last year due to cost.   Also consider Vascepa based on new outcomes data but one thing at a time. Denies chest pain or dyspnea with exertion  Walking daily, no activity limitations, energy level is good. Denies any palpitations  Only has dizziness with standing up too quickly, no syncope     He built a [facility] home on [facility], enjoying it. He is having trouble with anxiety and depression. Wokring with counseling and meds. No LE edema  Still with some chest discomfort postop. Assessment/Plan:  1. CAD- stable, 3 vessel CABG x 3 LIMA to LAD, RSVG to OM, RSVG to PDA 6/4/13 Dr. Lyle Dang, last stress test looked fine 2018  -continue on ASA and Livalo, no beta blocker secondary to dizziness/bradycardia  2. Dyslipidemia- , LDLp 1413 on Livalo 4mg dailly and Zetia 10mg daily   - FDA recommends Livalvo (FDA recommendations scanned into [facility]) for lipid therapy with concurrent HIV therapy  - discussed PCSK9 inhibitors, will research as above  3. NSTEMI- no ACEI/bblocker secondary to hypotension/dizziness, continue ASA and statin  4. HIV- followed by Dr. Donna Tovar, on Atripla, stable  5. DVT- at GS harvest site 7/13, gone on ultrasound after 6 months of coumadin therapy.    6. Valvular heart disease- mild MR/TR/PI, stable  7. Hypotension- stable now stay hydrated, etc    7/15 EF 60% mild TR/MR/PI  8/15 Nuc normal EF 60%  Venous duplex 7/10/13: DVT in GSV bilaterally, in the right extending into the CFV  CABG 6/4/13 - LIMA to LAD, RSVG to OM, RSVG to PDA  TTE 5/13 - LVEF 45 %, dyskinesis of the basal-mid anteroseptal and apical wall(s), trivial TR    Soc no tob no etoh  Fhx no early cad    He  has a past medical history of CAD (coronary artery disease), GERD (gastroesophageal reflux disease), Hiatal hernia, HIV (human immunodeficiency virus infection) ([address] and Hyperlipidemia. Cardiovascular ROS: negative for chest pain, dyspnea  Respiratory ROS: negative for - cough or hemoptysis  Neurological ROS: no TIA or stroke symptoms  All other systems negative except as above. PE  Vitals:    04/25/19 1150   Resp: 16   Weight: 156 lb 6.4 oz (70.9 kg)   Height: 5' 8\" (1.727 m)    Body mass index is 23.78 kg/m².    General appearance - alert, well appearing, and in no distress  Mental status - affect appropriate to mood  Eyes - sclera anicteric, moist mucous membranes  Neck - supple, no significant adenopathy  Lymphatics - not assessed   Chest - clear to auscultation, no wheezes, rales or rhonchi  Heart - normal rate, regular rhythm, normal S1, S2, 2/6 KESHAWN   Abdomen - soft, nontender, nondistended, no masses or organomegaly  Back exam - full range of motion, no tenderness  Neurological - cranial nerves II through XII grossly intact, no focal deficit  Musculoskeletal - no muscular tenderness noted, normal strength  Extremities - peripheral pulses normal, no pedal edema  Skin - normal coloration  no rashes    12 lead ECG: sinus bradycardia VR 58bpm     Recent Labs:  Lab Results   Component Value Date/Time    Cholesterol, total 176 01/11/2016 09:37 AM    Cholesterol, Total 209 (H) 04/11/2019 11:04 AM    HDL Cholesterol 47 01/11/2016 09:37 AM    LDL, calculated 103 (H) 01/11/2016 09:37 AM    Triglyceride 130 01/11/2016 09:37 AM    CHOL/HDL Ratio 6.6 (H) 05/30/2013 04:00 AM     Lab Results   Component Value Date/Time    Creatinine 1.14 04/11/2019 11:04 AM     Lab Results   Component Value Date/Time    BUN 19 04/11/2019 11:04 AM     Lab Results   Component Value Date/Time    Potassium 4.6 04/11/2019 11:04 AM     Lab Results   Component Value Date/Time    Hemoglobin A1c 5.4 03/12/2018 11:44 AM     Lab Results   Component Value Date/Time    HGB 14.1 04/11/2019 11:04 AM     Lab Results   Component Value Date/Time    PLATELET 658 11/82/1078 11:04 AM       Reviewed:  Past Medical History:   Diagnosis Date    CAD (coronary artery disease)     GERD (gastroesophageal reflux disease)     Hiatal hernia     [facility] infection) ([facility] [address]     Hyperlipidemia      Social History     Tobacco Use   Smoking Status Never Smoker   Smokeless Tobacco Never Used     Social History     Substance and Sexual Activity   Alcohol Use Yes    Alcohol/week: 0.0 oz    Frequency: 4 or more times a week    Drinks per session: 1 or 2    Comment: Moderate use     No Known Allergies    Current Outpatient Medications   Medication Sig    LIVALO 4 mg tab tablet TAKE 1 TABLET DAILY    ezetimibe (ZETIA) 10 mg tablet TAKE 1 TABLET DAILY    aspirin 81 mg chewable tablet Take 1 tablet by mouth daily.  fexofenadine (ALLEGRA) 180 mg tablet Take 180 mg by mouth daily as needed.  calcium 500 mg Tab Take 1 Tab by mouth daily.  albuterol (PROVENTIL HFA) 90 mcg/actuation inhaler Take 1 Puff by inhalation daily as needed.  cetirizine (ZYRTEC) 10 mg tablet Take  by mouth daily as needed.  efavirenz-emtrictabine-tenofovir (ATRIPLA) tablet Take 1 Tab by mouth nightly.  multivitamin (MULTI-DEYLIN) Liqd Take 5 mL by mouth daily. No current facility-administered medications for this visit.       [facility] [place]  [address], [address], [address] [place] [address]

## 2019-09-16 RX ORDER — PITAVASTATIN CALCIUM 4.18 MG/1
TABLET, FILM COATED ORAL
Qty: 90 TAB | Refills: 4 | Status: SHIPPED | OUTPATIENT
Start: 2019-09-16 | End: 2020-10-03

## 2020-04-30 ENCOUNTER — PATIENT MESSAGE (OUTPATIENT)
Dept: CARDIOLOGY CLINIC | Age: 70
End: 2020-04-30

## 2020-04-30 ENCOUNTER — VIRTUAL VISIT (OUTPATIENT)
Dept: CARDIOLOGY CLINIC | Age: 70
End: 2020-04-30

## 2020-04-30 VITALS
WEIGHT: 162 LBS | DIASTOLIC BLOOD PRESSURE: 71 MMHG | HEART RATE: 58 BPM | BODY MASS INDEX: 24.55 KG/M2 | SYSTOLIC BLOOD PRESSURE: 124 MMHG | HEIGHT: 68 IN

## 2020-04-30 DIAGNOSIS — E55.9 VITAMIN D DEFICIENCY: ICD-10-CM

## 2020-04-30 DIAGNOSIS — E78.5 DYSLIPIDEMIA, GOAL LDL BELOW 70: Primary | ICD-10-CM

## 2020-04-30 DIAGNOSIS — I36.1 NON-RHEUMATIC TRICUSPID VALVE INSUFFICIENCY: ICD-10-CM

## 2020-04-30 DIAGNOSIS — I25.119 ATHEROSCLEROSIS OF NATIVE CORONARY ARTERY OF NATIVE HEART WITH ANGINA PECTORIS (HCC): ICD-10-CM

## 2020-04-30 DIAGNOSIS — I07.1 TRICUSPID VALVE INSUFFICIENCY, UNSPECIFIED ETIOLOGY: ICD-10-CM

## 2020-04-30 DIAGNOSIS — I25.10 ATHEROSCLEROSIS OF NATIVE CORONARY ARTERY OF NATIVE HEART WITHOUT ANGINA PECTORIS: ICD-10-CM

## 2020-04-30 DIAGNOSIS — Z95.1 S/P CABG X 3: ICD-10-CM

## 2020-04-30 RX ORDER — BUPROPION HYDROCHLORIDE 300 MG/1
1 TABLET ORAL DAILY
COMMUNITY
Start: 2020-04-28

## 2020-04-30 RX ORDER — ESCITALOPRAM OXALATE 10 MG/1
1 TABLET ORAL DAILY
COMMUNITY
Start: 2020-04-28 | End: 2020-11-03 | Stop reason: DRUGHIGH

## 2020-04-30 RX ORDER — EVOLOCUMAB 140 MG/ML
420 INJECTION, SOLUTION SUBCUTANEOUS
Qty: 3 PEN | Refills: 3 | Status: SHIPPED | OUTPATIENT
Start: 2020-04-30 | End: 2020-05-01 | Stop reason: SDUPTHER

## 2020-04-30 RX ORDER — EVOLOCUMAB 140 MG/ML
420 INJECTION, SOLUTION SUBCUTANEOUS
COMMUNITY
End: 2020-04-30 | Stop reason: SDUPTHER

## 2020-04-30 NOTE — PATIENT INSTRUCTIONS
MD Court Garcia, RN  
  
   
  
fuv 6 mos  
 roslyn rx repatha or praluent thanks Requested Prescriptions Signed Prescriptions Disp Refills  evolocumab (Repatha SureClick) pen injection 3 Pen 3 Sig: 3 mL by SubCUTAneous route every month.

## 2020-04-30 NOTE — PROGRESS NOTES
[facility] [place]: Migue Dupree  [phone]  Pronounced \"sir-lakesha\"    HPI: Renee Gaines, a 77y.o. year-old who presents for follow up regarding his CAD. Sees Dr. Lucila Healy next month for annual.   He is watching is PSA and that is doing ok. Just got labs and LDL is higher than it has been, still not at goal of LDL <70 and LDLp <1000. He is exercising daily, doing well, fells well and not chest pain and no dyspnea, eating ok but poor appetite and struggling with hs anxiety, etc. Going to therapy but still battling it. Used to take fish oil, changed to flaxseed and using that daily. So overall not much room for improvement in lifestyle and we discussed additional medicine considerations. At this time will revisit repatha/praluent injection as as LDL not near goal on max statin therapy. Livalo is the required statin for him given his other medications not able to take lipitor or zocor    Denies chest pain or dyspnea with exertion  Walking daily, no activity limitations, energy level is good. Denies any palpitations  Only has dizziness with standing up too quickly, no syncope     He built a group home home on [facility], enjoying it. He is having trouble with anxiety and depression. Wokring with counseling and meds. No LE edema  Still with some chest discomfort postop. Assessment/Plan:  1. CAD- stable, 3 vessel CABG x 3 LIMA to LAD, RSVG to OM, RSVG to PDA 6/4/13 Dr. Ynes Lord, last stress test looked fine 2018  -continue on ASA and Livalo, no beta blocker secondary to dizziness/bradycardia  2. Dyslipidemia- , LDLp 1413 on Livalo 4mg dailly and Zetia 10mg daily   - FDA recommends Livalvo (FDA recommendations scanned into [facility]) for lipid therapy with concurrent HIV therapy  - discussed PCSK9 inhibitors, will research as above  3. NSTEMI- no ACEI/bblocker secondary to hypotension/dizziness, continue ASA and statin  4. HIV- followed by Dr. Carlin Mcgraw, on Atripla, stable  5.  DVT- at [facility] harvest site 7/13, gone on ultrasound after 6 months of coumadin therapy. 6. Valvular heart disease- mild MR/TR/PI, stable  7. Hypotension- stable now stay hydrated, etc    7/15 EF 60% mild TR/MR/PI  8/15 Nuc normal EF 60%  Venous duplex 7/10/13: DVT in GSV bilaterally, in the right extending into the CFV  CABG 6/4/13 - LIMA to LAD, RSVG to OM, RSVG to PDA  TTE 5/13 - LVEF 45 %, dyskinesis of the basal-mid anteroseptal and apical wall(s), trivial TR    Soc no tob no etoh  Fhx no early cad    He  has a past medical history of CAD (coronary artery disease), GERD (gastroesophageal reflux disease), Hiatal hernia, HIV (human immunodeficiency virus infection) ([address] and Hyperlipidemia. Cardiovascular ROS: negative for chest pain, dyspnea  Respiratory ROS: negative for - cough or hemoptysis  Neurological ROS: no TIA or stroke symptoms  All other systems negative except as above. PE  Vitals:    04/30/20 1029   BP: 124/71   Pulse: (!) 58   Weight: 162 lb (73.5 kg)   Height: 5' 8\" (1.727 m)    Body mass index is 24.63 kg/m².    General appearance - alert, well appearing, and in no distress  Mental status - affect appropriate to mood  Eyes - sclera anicteric, moist mucous membranes  Neuro intact speech clear normal thought process  Breathing stable no wheezing etc.     12 lead ECG: sinus bradycardia VR 58bpm     Recent Labs:  Lab Results   Component Value Date/Time    Cholesterol, total 176 01/11/2016 09:37 AM    Cholesterol, Total 190 04/24/2020 11:11 AM    HDL Cholesterol 47 01/11/2016 09:37 AM    LDL, calculated 103 (H) 01/11/2016 09:37 AM    Triglyceride 130 01/11/2016 09:37 AM    CHOL/HDL Ratio 6.6 (H) 05/30/2013 04:00 AM     Lab Results   Component Value Date/Time    Creatinine 1.18 04/24/2020 11:11 AM     Lab Results   Component Value Date/Time    BUN 15 04/24/2020 11:11 AM     Lab Results   Component Value Date/Time    Potassium 4.0 04/24/2020 11:11 AM     Lab Results   Component Value Date/Time    Hemoglobin A1c 5.4 03/12/2018 11:44 AM     Lab Results   Component Value Date/Time    HGB 13.8 04/24/2020 11:11 AM     Lab Results   Component Value Date/Time    PLATELET 705 39/93/2785 11:11 AM       Reviewed:  Past Medical History:   Diagnosis Date    CAD (coronary artery disease)     GERD (gastroesophageal reflux disease)     Hiatal hernia     HIV ([facility] immunodeficiency virus infection) [facility]     Hyperlipidemia      Social History     Tobacco Use   Smoking Status Never Smoker   Smokeless Tobacco Never Used     Social History     Substance and Sexual Activity   Alcohol Use Yes    Alcohol/week: 0.0 standard drinks    Frequency: 4 or more times a week    Drinks per session: 1 or 2    Comment: Moderate use     No Known Allergies    Current Outpatient Medications   Medication Sig    ezetimibe (ZETIA) 10 mg tablet TAKE 1 TABLET DAILY    LIVALO 4 mg tab tablet TAKE 1 TABLET DAILY    aspirin 81 mg chewable tablet Take 1 tablet by mouth daily.  fexofenadine (ALLEGRA) 180 mg tablet Take 180 mg by mouth daily as needed.  albuterol (PROVENTIL HFA) 90 mcg/actuation inhaler Take 1 Puff by inhalation daily as needed.  efavirenz-emtrictabine-tenofovir (ATRIPLA) tablet Take 1 Tab by mouth nightly.  buPROPion XL (WELLBUTRIN XL) 300 mg XL tablet Take 1 Tab by mouth daily.  escitalopram oxalate (LEXAPRO) 10 mg tablet Take 1 Tab by mouth daily.  calcium 500 mg Tab Take 1 Tab by mouth daily.  cetirizine (ZYRTEC) 10 mg tablet Take  by mouth daily as needed.  multivitamin (MULTI-DEYLIN) Liqd Take 5 mL by mouth daily. No current facility-administered medications for this visit.       [employer] heart and [address], [address] [place] [address]               VIRTUAL VISIT DOCUMENTATION     Pursuant to the emergency declaration under the [facility], [facility] authority and the [employer] and [employer] General Act, this Virtual  Visit was conducted, with patient's consent, to reduce the patient's risk of exposure to COVID-19 and provide continuity of care for an established patient. Services were provided through a video synchronous discussion virtually to substitute for in-person clinic visit. CHIEF COMPLAINT      Rom Castro is a 77 y.o. male who was seen by synchronous (real-time) audio-video technology on 4/30/2020. Patient is being seen today for      ASSESSMENT           PLAN       We discussed the expected course, resolution and complications of the diagnosis(es) in detail. Medication risks, benefits, costs, interactions, and alternatives were discussed as indicated. I advised him to contact the office if his condition worsens, changes or fails to improve as anticipated.  He expressed understanding with the diagnosis(es) and plan    HISTORY OF PRESENTING ILLNESS      Duane Norton Hidden is a 77 y.o. male        ACTIVE PROBLEM LIST     Patient Active Problem List    Diagnosis Date Noted    Tricuspid regurgitation 65/63/9858    Systolic ejection murmur 91/14/3326    HIV (human immunodeficiency virus infection) ([facility] 07/10/2013    Dyslipidemia, goal LDL below 70 07/10/2013    Chronic venous embolism and thrombosis of unspecified deep vessels of lower extremity 07/10/2013    S/P CABG x 3 06/04/2013    Coronary atherosclerosis of native coronary artery 05/29/2013           PAST MEDICAL HISTORY     Past Medical History:   Diagnosis Date    CAD (coronary artery disease)     GERD (gastroesophageal reflux disease)     Hiatal hernia     HIV (human immunodeficiency virus infection) ([facility]     Hyperlipidemia            PAST SURGICAL HISTORY     Past Surgical History:   Procedure Laterality Date    CARDIAC SURG PROCEDURE UNLIST  6/4/2013    CABG          ALLERGIES     No Known Allergies       FAMILY HISTORY     Family History   Problem Relation Age of Onset    Cancer Mother     Heart Disease Mother     Lung Disease Father     Alcohol abuse Brother     negative for cardiac disease       SOCIAL HISTORY     Social History     Socioeconomic History    Marital status:      Spouse name: Not on file    Number of children: Not on file    Years of education: Not on file    Highest education level: Not on file   Tobacco Use    Smoking status: Never Smoker    Smokeless tobacco: Never Used   Substance and Sexual Activity    Alcohol use: Yes     Alcohol/week: 0.0 standard drinks     Frequency: 4 or more times a week     Drinks per session: 1 or 2     Comment: Moderate use    Drug use: No         MEDICATIONS     Current Outpatient Medications   Medication Sig    ezetimibe (ZETIA) 10 mg tablet TAKE 1 TABLET DAILY    LIVALO 4 mg tab tablet TAKE 1 TABLET DAILY    aspirin 81 mg chewable tablet Take 1 tablet by mouth daily.  fexofenadine (ALLEGRA) 180 mg tablet Take 180 mg by mouth daily as needed.  albuterol (PROVENTIL HFA) 90 mcg/actuation inhaler Take 1 Puff by inhalation daily as needed.  efavirenz-emtrictabine-tenofovir (ATRIPLA) tablet Take 1 Tab by mouth nightly.  buPROPion XL (WELLBUTRIN XL) 300 mg XL tablet Take 1 Tab by mouth daily.  escitalopram oxalate (LEXAPRO) 10 mg tablet Take 1 Tab by mouth daily.  calcium 500 mg Tab Take 1 Tab by mouth daily.  cetirizine (ZYRTEC) 10 mg tablet Take  by mouth daily as needed.  multivitamin (MULTI-DEYLIN) Liqd Take 5 mL by mouth daily. No current facility-administered medications for this visit. I have reviewed the nurses notes, vitals, problem list, allergy list, medical history, family, social history and medications. REVIEW OF SYMPTOMS     Constitutional: Negative for fever, chills, malaise/fatigue and diaphoresis. Respiratory: Negative for cough, hemoptysis, sputum production, shortness of breath and wheezing.    Cardiovascular: Negative for chest pain, palpitations, orthopnea, claudication, leg swelling and PND.  Gastrointestinal: Negative for heartburn, nausea, vomiting, blood in stool and melena. Genitourinary: Negative for dysuria and flank pain. Musculoskeletal: Negative for joint pain and back pain. Skin: Negative for rash. Neurological: Negative for focal weakness, seizures, loss of consciousness, weakness and headaches. Endo/Heme/Allergies: Negative for abnormal bleeding. Psychiatric/Behavioral: Negative for memory loss. PHYSICAL EXAMINATION      Due to this being a TeleHealth evaluation, many elements of the physical examination are unable to be assessed. General: Well developed, in no acute distress, cooperative and alert  HEENT: Pupils equal/round. No marked JVD visible on video. Respiratory: No audible wheezing, no signs of respiratory distress, lips non cyanotic  Extremities:  No edema  Neuro: A&Ox3, speech clear, no facial droop, answering questions appropriately  Skin: Skin color is normal. No rashes or lesions. Non diaphoretic on visible skin during exam       DIAGNOSTIC DATA      No specialty comments available. LABORATORY DATA      Lab Results   Component Value Date/Time    WBC 4.8 04/24/2020 11:11 AM    HGB 13.8 04/24/2020 11:11 AM    HCT 40.7 04/24/2020 11:11 AM    PLATELET 295 55/74/3625 11:11 AM    MCV 97 04/24/2020 11:11 AM      Lab Results   Component Value Date/Time    Sodium 142 04/24/2020 11:11 AM    Potassium 4.0 04/24/2020 11:11 AM    Chloride 104 04/24/2020 11:11 AM    CO2 23 04/24/2020 11:11 AM    Anion gap 8 06/08/2013 04:00 AM    Glucose 93 04/24/2020 11:11 AM    BUN 15 04/24/2020 11:11 AM    Creatinine 1.18 04/24/2020 11:11 AM    BUN/Creatinine ratio 13 04/24/2020 11:11 AM    GFR est AA 73 04/24/2020 11:11 AM    GFR est non-AA 63 04/24/2020 11:11 AM    Calcium 9.0 04/24/2020 11:11 AM    Bilirubin, total 0.3 04/24/2020 11:11 AM    AST (SGOT) 38 04/24/2020 11:11 AM    Alk.  phosphatase 117 04/24/2020 11:11 AM    Protein, total 6.6 04/24/2020 11:11 AM    Albumin 4.5 04/24/2020 11:11 AM    Globulin 2.9 06/07/2013 04:50 AM    A-G Ratio 2.1 04/24/2020 11:11 AM    ALT (SGPT) 31 04/24/2020 11:11 AM             FOLLOW-UP            Patient was made aware and verbalized understanding that an appointment will be scheduled for them for a virtual visit and/or office visit within the above time frame. Patient understanding his/her responsibility to call and change time/date if he/she so chooses. Thank you, Marguerite Yeh MD for allowing me to participate in the care of Reina Oliveros. Please do not hesitate to contact me for further questions/concerns. Greater than 20 minutes was spent in direct video patient care, planning and chart review. This visit was conducted using [employer] telemedicine services.        Angel Tam MD    [place] [address]        [phone] / [phone] Fax       [facility], [address] [place] [address]  [facility]  [phone] / [phone] Fax

## 2020-05-01 ENCOUNTER — TELEPHONE (OUTPATIENT)
Dept: CARDIOLOGY CLINIC | Age: 70
End: 2020-05-01

## 2020-05-01 RX ORDER — EVOLOCUMAB 140 MG/ML
420 INJECTION, SOLUTION SUBCUTANEOUS
Qty: 3 PEN | Refills: 3 | Status: SHIPPED | OUTPATIENT
Start: 2020-05-01 | End: 2020-05-11 | Stop reason: SDUPTHER

## 2020-05-01 NOTE — TELEPHONE ENCOUNTER
Returned call to [employer]. Patient wants to have his scripts filled through [address] JERONIMO Tobin has cancelled the order. Prior Renea Hence has been completed and waiting on outcome.  2 pt identifiers used

## 2020-05-01 NOTE — TELEPHONE ENCOUNTER
[facility] is calling to check on the status of a PA for Repatha.      Phone: [phone]  Ref number [id number]

## 2020-05-06 ENCOUNTER — PATIENT MESSAGE (OUTPATIENT)
Dept: CARDIOLOGY CLINIC | Age: 70
End: 2020-05-06

## 2020-05-06 RX ORDER — CHOLECALCIFEROL (VITAMIN D3) 125 MCG
CAPSULE ORAL
COMMUNITY

## 2020-05-07 ENCOUNTER — TELEPHONE (OUTPATIENT)
Dept: CARDIOLOGY CLINIC | Age: 70
End: 2020-05-07

## 2020-05-11 RX ORDER — EVOLOCUMAB 140 MG/ML
420 INJECTION, SOLUTION SUBCUTANEOUS
Qty: 3 PEN | Refills: 3 | Status: SHIPPED | OUTPATIENT
Start: 2020-05-11 | End: 2020-05-27 | Stop reason: SDUPTHER

## 2020-05-11 NOTE — TELEPHONE ENCOUNTER
Requested Prescriptions     Signed Prescriptions Disp Refills    evolocumab (Repatha SureClick) pen injection 3 Pen 3     Sig: 3 mL by SubCUTAneous route every month.      Authorizing Provider: Monalisa Ear     Ordering User: Aston Montero     Per verbal orders

## 2020-05-27 RX ORDER — EVOLOCUMAB 140 MG/ML
420 INJECTION, SOLUTION SUBCUTANEOUS
Qty: 6 PEN | Refills: 3 | Status: SHIPPED | OUTPATIENT
Start: 2020-05-27 | End: 2020-05-29 | Stop reason: SDUPTHER

## 2020-05-27 NOTE — TELEPHONE ENCOUNTER
Requested Prescriptions     Signed Prescriptions Disp Refills    evolocumab (Repatha SureClick) pen injection 6 Pen 3     Sig: 3 mL by SubCUTAneous route every month.      Authorizing Provider: Socorro Brown     Ordering User: Efrain Sorto     Per verbal orders

## 2020-05-29 RX ORDER — EVOLOCUMAB 140 MG/ML
140 INJECTION, SOLUTION SUBCUTANEOUS
Qty: 6 PEN | Refills: 3
Start: 2020-05-29 | End: 2021-04-02

## 2020-05-29 NOTE — TELEPHONE ENCOUNTER
Requested Prescriptions     Signed Prescriptions Disp Refills    evolocumab (Repatha SureClick) pen injection 6 Pen 3     Si mL by SubCUTAneous route every fourteen (14) days.      Authorizing Provider: Mark Gracia     Ordering User: Dax Burgos     Per verbal orders

## 2020-10-03 RX ORDER — PITAVASTATIN CALCIUM 4.18 MG/1
TABLET, FILM COATED ORAL
Qty: 90 TAB | Refills: 3 | Status: SHIPPED | OUTPATIENT
Start: 2020-10-03 | End: 2021-08-24

## 2020-10-19 DIAGNOSIS — I25.10 ATHEROSCLEROSIS OF NATIVE CORONARY ARTERY OF NATIVE HEART WITHOUT ANGINA PECTORIS: ICD-10-CM

## 2020-10-19 DIAGNOSIS — E78.5 DYSLIPIDEMIA, GOAL LDL BELOW 70: ICD-10-CM

## 2020-10-19 DIAGNOSIS — E55.9 VITAMIN D DEFICIENCY: ICD-10-CM

## 2020-10-20 DIAGNOSIS — I25.10 ATHEROSCLEROSIS OF NATIVE CORONARY ARTERY OF NATIVE HEART WITHOUT ANGINA PECTORIS: ICD-10-CM

## 2020-10-20 DIAGNOSIS — E78.5 DYSLIPIDEMIA, GOAL LDL BELOW 70: ICD-10-CM

## 2020-10-20 DIAGNOSIS — E55.9 VITAMIN D DEFICIENCY: ICD-10-CM

## 2020-10-29 ENCOUNTER — HOSPITAL ENCOUNTER (OUTPATIENT)
Dept: LAB | Age: 70
Discharge: HOME | End: 2020-10-29
Payer: MEDICARE

## 2020-10-29 PROCEDURE — 36415 COLL VENOUS BLD VENIPUNCTURE: CPT

## 2020-10-29 PROCEDURE — 83704 LIPOPROTEIN BLD QUAN PART: CPT

## 2020-10-29 PROCEDURE — 82306 VITAMIN D 25 HYDROXY: CPT

## 2020-10-31 LAB
25(OH)D3+25(OH)D2 SERPL-MCNC: 40.6 NG/ML (ref 30–100)
CHOLEST SERPL-MCNC: 121 MG/DL (ref 100–199)
HDL SERPL-SCNC: 35.6 UMOL/L
HDLC SERPL-MCNC: 57 MG/DL
INTERPRETATION, 910389: NORMAL
LDL SERPL QN: 21.7 NM
LDL SERPL-SCNC: 519 NMOL/L
LDL SMALL SERPL-SCNC: 182 NMOL/L
LDLC SERPL CALC-MCNC: 47 MG/DL (ref 0–99)
LP-IR SCORE SERPL: 27
TRIGL SERPL-MCNC: 87 MG/DL (ref 0–149)

## 2020-11-03 ENCOUNTER — VIRTUAL VISIT (OUTPATIENT)
Dept: CARDIOLOGY CLINIC | Age: 70
End: 2020-11-03
Payer: MEDICARE

## 2020-11-03 DIAGNOSIS — I25.10 ATHEROSCLEROSIS OF NATIVE CORONARY ARTERY OF NATIVE HEART WITHOUT ANGINA PECTORIS: Primary | ICD-10-CM

## 2020-11-03 PROCEDURE — 3017F COLORECTAL CA SCREEN DOC REV: CPT | Performed by: INTERNAL MEDICINE

## 2020-11-03 PROCEDURE — 1101F PT FALLS ASSESS-DOCD LE1/YR: CPT | Performed by: INTERNAL MEDICINE

## 2020-11-03 PROCEDURE — G0463 HOSPITAL OUTPT CLINIC VISIT: HCPCS | Performed by: INTERNAL MEDICINE

## 2020-11-03 PROCEDURE — 99214 OFFICE O/P EST MOD 30 MIN: CPT | Performed by: INTERNAL MEDICINE

## 2020-11-03 PROCEDURE — G8432 DEP SCR NOT DOC, RNG: HCPCS | Performed by: INTERNAL MEDICINE

## 2020-11-03 PROCEDURE — G8427 DOCREV CUR MEDS BY ELIG CLIN: HCPCS | Performed by: INTERNAL MEDICINE

## 2020-11-03 RX ORDER — CHROMIUM PICOLINATE 200 MCG
TABLET ORAL
COMMUNITY

## 2020-11-03 RX ORDER — ESCITALOPRAM OXALATE 20 MG/1
TABLET ORAL
COMMUNITY
Start: 2020-09-04

## 2020-11-03 RX ORDER — BUSPIRONE HYDROCHLORIDE 10 MG/1
TABLET ORAL
COMMUNITY
Start: 2020-08-31

## 2020-11-03 NOTE — PROGRESS NOTES
[facility]: Celia Card  [phone]  Pronounced \"sir-gent\"    HPI: Noelle Frank, a 72y.o. year-old who presents for follow up regarding his CAD. He is watching his PSA and that is doing ok. BP/pulse 121/71 and pulse 58, usually doing well and feels good. 40 min a day power walking in the morning. Livalo is the required statin for him given his other medications not able to take lipitor or zocor    Denies chest pain or dyspnea with exertion  Walking daily, no activity limitations, energy level is good. Denies any palpitations  Only has dizziness with standing up too quickly, no syncope     He built a assisted home on [facility], enjoying it. He is having trouble with anxiety and depression. Wokring with counseling and meds. No LE edema  Still with some chest discomfort postop. He is now on meds for the anxiety and depression. Doing meditation and thinks that is helping him as well, doing it twice a day. Discussed stress and the heart and the need to continue to control the anxiety and depression. Assessment/Plan:  1. CAD- stable, 3 vessel CABG x 3 LIMA to LAD, RSVG to OM, RSVG to PDA 6/4/13 Dr. Leighann Randhawa, last stress test looked fine 2018  -continue on ASA and Livalo, no beta blocker secondary to dizziness/bradycardia  2. Dyslipidemia- , LDLp 1413 on Livalo 4mg dailly and Zetia 10mg daily   - FDA recommends Livalvo (FDA recommendations scanned into [facility]) for lipid therapy with concurrent HIV therapy  - discussed PCSK9 inhibitors, will research as above  3. NSTEMI- no ACEI/bblocker secondary to hypotension/dizziness, continue ASA and statin  4. HIV- followed by Dr. Rolan Cyr, on Atripla, stable  5. DVT- at GSV harvest site 7/13, gone on ultrasound after 6 months of coumadin therapy. 6. Valvular heart disease- mild MR/TR/PI, stable  7.  Hypotension- stable now stay hydrated, etc      7/15 EF 60% mild TR/MR/PI  8/15 Nuc normal EF 60%  Venous duplex 7/10/13: DVT in GSV bilaterally, in the right extending into the CFV  CABG 6/4/13 - LIMA to LAD, RSVG to OM, RSVG to PDA  TTE 5/13 - LVEF 45 %, dyskinesis of the basal-mid anteroseptal and apical wall(s), trivial TR    Soc no tob no etoh  Fhx no early cad    He  has a past medical history of CAD (coronary artery disease), GERD (gastroesophageal reflux disease), Hiatal hernia, HIV (human immunodeficiency virus infection) ([address] and Hyperlipidemia. Cardiovascular ROS: negative for chest pain, dyspnea  Respiratory ROS: negative for - cough or hemoptysis  Neurological ROS: no TIA or stroke symptoms  All other systems negative except as above. PE  There were no vitals filed for this visit. There is no height or weight on file to calculate BMI.    General appearance - alert, well appearing, and in no distress  Mental status - affect appropriate to mood  Eyes - sclera anicteric, moist mucous membranes  Neuro intact speech clear normal thought process  Breathing stable no wheezing etc.     12 lead ECG: sinus bradycardia VR 58bpm     Recent Labs:  Lab Results   Component Value Date/Time    Cholesterol, total 176 01/11/2016 09:37 AM    Cholesterol, Total 121 10/29/2020 10:56 AM    HDL Cholesterol 47 01/11/2016 09:37 AM    LDL, calculated 103 (H) 01/11/2016 09:37 AM    Triglyceride 130 01/11/2016 09:37 AM    CHOL/HDL Ratio 6.6 (H) 05/30/2013 04:00 AM     Lab Results   Component Value Date/Time    Creatinine 1.18 04/24/2020 11:11 AM     Lab Results   Component Value Date/Time    BUN 15 04/24/2020 11:11 AM     Lab Results   Component Value Date/Time    Potassium 4.0 04/24/2020 11:11 AM     Lab Results   Component Value Date/Time    Hemoglobin A1c 5.4 03/12/2018 11:44 AM     Lab Results   Component Value Date/Time    HGB 13.8 04/24/2020 11:11 AM     Lab Results   Component Value Date/Time    PLATELET 241 31/13/1444 11:11 AM       Reviewed:  Past Medical History:   Diagnosis Date    CAD (coronary artery disease)     GERD (gastroesophageal reflux disease)     Hiatal hernia     [facility]) [facility]     Hyperlipidemia      Social History     Tobacco Use   Smoking Status Never Smoker   Smokeless Tobacco Never Used     Social History     Substance and Sexual Activity   Alcohol Use Yes    Alcohol/week: 0.0 standard drinks    Frequency: 4 or more times a week    Drinks per session: 1 or 2    Comment: Moderate use     No Known Allergies    Current Outpatient Medications   Medication Sig    busPIRone (BUSPAR) 10 mg tablet     escitalopram oxalate (LEXAPRO) 20 mg tablet     ashwagandha root extract 300 mg cap Take  by mouth.  Livalo 4 mg tab tablet TAKE 1 TABLET DAILY    evolocumab (Repatha SureClick) pen injection 1 mL by SubCUTAneous route every fourteen (14) days.  cholecalciferol, vitamin D3, (Vitamin D3) 50 mcg (2,000 unit) tab Take  by mouth.  buPROPion XL (WELLBUTRIN XL) 300 mg XL tablet Take 1 Tab by mouth daily.  ezetimibe (ZETIA) 10 mg tablet TAKE 1 TABLET DAILY    aspirin 81 mg chewable tablet Take 1 tablet by mouth daily.  fexofenadine (ALLEGRA) 180 mg tablet Take 180 mg by mouth daily as needed.  albuterol (PROVENTIL HFA) 90 mcg/actuation inhaler Take 1 Puff by inhalation daily as needed.  efavirenz-emtrictabine-tenofovir (ATRIPLA) tablet Take 1 Tab by mouth nightly. No current facility-administered medications for this visit. [employer] [facility] [place]  [address], [address], [address] [place] [address]               VIRTUAL VISIT DOCUMENTATION     Pursuant to the emergency declaration under the [address] [place] [address] waiver authority and the [employer] Act, this Virtual  Visit was conducted, with patient's consent, to reduce the patient's risk of exposure to COVID-19 and provide continuity of care for an established patient.      Services were provided through a video synchronous discussion virtually to substitute for in-person clinic visit. CHIEF COMPLAINT      Silvia Crews is a 72 y.o. male who was seen by synchronous (real-time) audio-video technology on 11/3/2020. Patient is being seen today for      ASSESSMENT           PLAN       We discussed the expected course, resolution and complications of the diagnosis(es) in detail. Medication risks, benefits, costs, interactions, and alternatives were discussed as indicated. I advised him to contact the office if his condition worsens, changes or fails to improve as anticipated.  He expressed understanding with the diagnosis(es) and plan    HISTORY OF PRESENTING ILLNESS      Silvia Crews is a 72 y.o. male        ACTIVE PROBLEM LIST     Patient Active Problem List    Diagnosis Date Noted    Tricuspid regurgitation 22/56/4411    Systolic ejection murmur 76/02/3563    HIV (human immunodeficiency virus infection) ([facility] 07/10/2013    Dyslipidemia, goal LDL below 70 07/10/2013    Chronic venous embolism and thrombosis of unspecified deep vessels of lower extremity 07/10/2013    S/P CABG x 3 06/04/2013    Coronary atherosclerosis of native coronary artery 05/29/2013           PAST MEDICAL HISTORY     Past Medical History:   Diagnosis Date    CAD (coronary artery disease)     GERD (gastroesophageal reflux disease)     Hiatal hernia     HIV (human immunodeficiency virus infection) ([facility]     Hyperlipidemia            PAST SURGICAL HISTORY     Past Surgical History:   Procedure Laterality Date    CARDIAC SURG PROCEDURE UNLIST  6/4/2013    CABG          ALLERGIES     No Known Allergies       FAMILY HISTORY     Family History   Problem Relation Age of Onset    Cancer Mother     Heart Disease Mother     Lung Disease Father     Alcohol abuse Brother     negative for cardiac disease       SOCIAL HISTORY     Social History     Socioeconomic History    Marital status:      Spouse name: Not on file    Number of children: Not on file    Years of education: Not on file    Highest education level: Not on file   Tobacco Use    Smoking status: Never Smoker    Smokeless tobacco: Never Used   Substance and Sexual Activity    Alcohol use: Yes     Alcohol/week: 0.0 standard drinks     Frequency: 4 or more times a week     Drinks per session: 1 or 2     Comment: Moderate use    Drug use: No         MEDICATIONS     Current Outpatient Medications   Medication Sig    busPIRone (BUSPAR) 10 mg tablet     escitalopram oxalate (LEXAPRO) 20 mg tablet     ashwagandha root extract 300 mg cap Take  by mouth.  Livalo 4 mg tab tablet TAKE 1 TABLET DAILY    evolocumab (Repatha SureClick) pen injection 1 mL by SubCUTAneous route every fourteen (14) days.  cholecalciferol, vitamin D3, (Vitamin D3) 50 mcg (2,000 unit) tab Take  by mouth.  buPROPion XL (WELLBUTRIN XL) 300 mg XL tablet Take 1 Tab by mouth daily.  ezetimibe (ZETIA) 10 mg tablet TAKE 1 TABLET DAILY    aspirin 81 mg chewable tablet Take 1 tablet by mouth daily.  fexofenadine (ALLEGRA) 180 mg tablet Take 180 mg by mouth daily as needed.  albuterol (PROVENTIL HFA) 90 mcg/actuation inhaler Take 1 Puff by inhalation daily as needed.  efavirenz-emtrictabine-tenofovir (ATRIPLA) tablet Take 1 Tab by mouth nightly. No current facility-administered medications for this visit. I have reviewed the nurses notes, vitals, problem list, allergy list, medical history, family, social history and medications. REVIEW OF SYMPTOMS     Constitutional: Negative for fever, chills, malaise/fatigue and diaphoresis. Respiratory: Negative for cough, hemoptysis, sputum production, shortness of breath and wheezing. Cardiovascular: Negative for chest pain, palpitations, orthopnea, claudication, leg swelling and PND. Gastrointestinal: Negative for heartburn, nausea, vomiting, blood in stool and melena. Genitourinary: Negative for dysuria and flank pain.   Musculoskeletal: Negative for joint pain and back pain. Skin: Negative for rash. Neurological: Negative for focal weakness, seizures, loss of consciousness, weakness and headaches. Endo/Heme/Allergies: Negative for abnormal bleeding. Psychiatric/Behavioral: Negative for memory loss. PHYSICAL EXAMINATION      Due to this being a TeleHealth evaluation, many elements of the physical examination are unable to be assessed. General: Well developed, in no acute distress, cooperative and alert  HEENT: Pupils equal/round. No marked JVD visible on video. Respiratory: No audible wheezing, no signs of respiratory distress, lips non cyanotic  Extremities:  No edema  Neuro: A&Ox3, speech clear, no facial droop, answering questions appropriately  Skin: Skin color is normal. No rashes or lesions. Non diaphoretic on visible skin during exam       DIAGNOSTIC DATA      No specialty comments available. LABORATORY DATA      Lab Results   Component Value Date/Time    WBC 4.8 04/24/2020 11:11 AM    HGB 13.8 04/24/2020 11:11 AM    HCT 40.7 04/24/2020 11:11 AM    PLATELET 975 00/01/4780 11:11 AM    MCV 97 04/24/2020 11:11 AM      Lab Results   Component Value Date/Time    Sodium 142 04/24/2020 11:11 AM    Potassium 4.0 04/24/2020 11:11 AM    Chloride 104 04/24/2020 11:11 AM    CO2 23 04/24/2020 11:11 AM    Anion gap 8 06/08/2013 04:00 AM    Glucose 93 04/24/2020 11:11 AM    BUN 15 04/24/2020 11:11 AM    Creatinine 1.18 04/24/2020 11:11 AM    BUN/Creatinine ratio 13 04/24/2020 11:11 AM    GFR est AA 73 04/24/2020 11:11 AM    GFR est non-AA 63 04/24/2020 11:11 AM    Calcium 9.0 04/24/2020 11:11 AM    Bilirubin, total 0.3 04/24/2020 11:11 AM    Alk.  phosphatase 117 04/24/2020 11:11 AM    Protein, total 6.6 04/24/2020 11:11 AM    Albumin 4.5 04/24/2020 11:11 AM    Globulin 2.9 06/07/2013 04:50 AM    A-G Ratio 2.1 04/24/2020 11:11 AM    ALT (SGPT) 31 04/24/2020 11:11 AM             FOLLOW-UP            Patient was made aware and verbalized understanding that an appointment will be scheduled for them for a virtual visit and/or office visit within the above time frame. Patient understanding his/her responsibility to call and change time/date if he/she so chooses. Thank you, Diogenes Morris MD for allowing me to participate in the care of Addie Dey. Please do not hesitate to contact me for further questions/concerns. Greater than 20 minutes was spent in direct video patient care, planning and chart review. This visit was conducted using [employer] telemedicine services.        Richy Conn MD    [place] [address]        [phone] / [phone] Fax       [facility], [address] [place] [address]  Matt Phan  [phone] / [phone] Fax

## 2021-03-17 RX ORDER — EZETIMIBE 10 MG/1
TABLET ORAL
Qty: 90 TAB | Refills: 3 | Status: SHIPPED | OUTPATIENT
Start: 2021-03-17 | End: 2022-03-08

## 2021-04-02 RX ORDER — EVOLOCUMAB 140 MG/ML
INJECTION, SOLUTION SUBCUTANEOUS
Qty: 6 ML | Refills: 3 | Status: SHIPPED | OUTPATIENT
Start: 2021-04-02 | End: 2021-07-29

## 2021-05-06 ENCOUNTER — HOSPITAL ENCOUNTER (OUTPATIENT)
Dept: LAB | Age: 71
Discharge: HOME | End: 2021-05-06
Payer: MEDICARE

## 2021-05-06 PROCEDURE — 83704 LIPOPROTEIN BLD QUAN PART: CPT

## 2021-05-06 PROCEDURE — 36415 COLL VENOUS BLD VENIPUNCTURE: CPT

## 2021-05-06 PROCEDURE — 80053 COMPREHEN METABOLIC PANEL: CPT

## 2021-05-06 PROCEDURE — 83735 ASSAY OF MAGNESIUM: CPT

## 2021-05-18 ENCOUNTER — ANCILLARY PROCEDURE (OUTPATIENT)
Dept: CARDIOLOGY CLINIC | Age: 71
End: 2021-05-18
Payer: MEDICARE

## 2021-05-18 ENCOUNTER — OFFICE VISIT (OUTPATIENT)
Dept: CARDIOLOGY CLINIC | Age: 71
End: 2021-05-18
Payer: MEDICARE

## 2021-05-18 VITALS
WEIGHT: 162 LBS | SYSTOLIC BLOOD PRESSURE: 110 MMHG | HEIGHT: 68 IN | BODY MASS INDEX: 24.55 KG/M2 | DIASTOLIC BLOOD PRESSURE: 70 MMHG

## 2021-05-18 VITALS
HEIGHT: 68 IN | SYSTOLIC BLOOD PRESSURE: 110 MMHG | HEART RATE: 77 BPM | OXYGEN SATURATION: 99 % | DIASTOLIC BLOOD PRESSURE: 70 MMHG | BODY MASS INDEX: 24.63 KG/M2

## 2021-05-18 DIAGNOSIS — I36.1 NONRHEUMATIC TRICUSPID VALVE REGURGITATION: ICD-10-CM

## 2021-05-18 DIAGNOSIS — E55.9 VITAMIN D DEFICIENCY: ICD-10-CM

## 2021-05-18 DIAGNOSIS — R01.1 SYSTOLIC EJECTION MURMUR: ICD-10-CM

## 2021-05-18 DIAGNOSIS — Z95.1 S/P CABG X 3: ICD-10-CM

## 2021-05-18 DIAGNOSIS — I25.10 ATHEROSCLEROSIS OF NATIVE CORONARY ARTERY OF NATIVE HEART WITHOUT ANGINA PECTORIS: ICD-10-CM

## 2021-05-18 DIAGNOSIS — E78.5 DYSLIPIDEMIA, GOAL LDL BELOW 70: ICD-10-CM

## 2021-05-18 DIAGNOSIS — R94.30 EJECTION FRACTION < 50%: Primary | ICD-10-CM

## 2021-05-18 DIAGNOSIS — R94.30 EJECTION FRACTION < 50%: ICD-10-CM

## 2021-05-18 LAB
ECHO AO ASC DIAM: 3.58 CM
ECHO AO ROOT DIAM: 3.2 CM
ECHO AV AREA PEAK VELOCITY: 2.75 CM2
ECHO AV AREA VTI: 2.83 CM2
ECHO AV AREA/BSA PEAK VELOCITY: 1.5 CM2/M2
ECHO AV AREA/BSA VTI: 1.5 CM2/M2
ECHO AV MEAN GRADIENT: 2.96 MMHG
ECHO AV PEAK GRADIENT: 5.62 MMHG
ECHO AV PEAK VELOCITY: 118.52 CM/S
ECHO AV VTI: 24.72 CM
ECHO LA AREA 4C: 17.46 CM2
ECHO LA MAJOR AXIS: 4.55 CM
ECHO LA MINOR AXIS: 2.43 CM
ECHO LA VOL 2C: 57.54 ML (ref 18–58)
ECHO LA VOL 4C: 43.39 ML (ref 18–58)
ECHO LA VOL BP: 55.63 ML (ref 18–58)
ECHO LA VOL/BSA BIPLANE: 29.77 ML/M2 (ref 16–28)
ECHO LA VOLUME INDEX A2C: 30.79 ML/M2 (ref 16–28)
ECHO LA VOLUME INDEX A4C: 23.22 ML/M2 (ref 16–28)
ECHO LV E' LATERAL VELOCITY: 9.32 CM/S
ECHO LV E' SEPTAL VELOCITY: 4.6 CM/S
ECHO LV EDV A2C: 89.08 ML
ECHO LV EDV A4C: 116.72 ML
ECHO LV EDV BP: 103.7 ML (ref 67–155)
ECHO LV EDV INDEX A4C: 62.5 ML/M2
ECHO LV EDV INDEX BP: 55.5 ML/M2
ECHO LV EDV NDEX A2C: 47.7 ML/M2
ECHO LV EJECTION FRACTION A2C: 68 PERCENT
ECHO LV EJECTION FRACTION A4C: 72 PERCENT
ECHO LV EJECTION FRACTION BIPLANE: 70.2 PERCENT (ref 55–100)
ECHO LV ESV A2C: 28.8 ML
ECHO LV ESV A4C: 32.11 ML
ECHO LV ESV BP: 30.93 ML (ref 22–58)
ECHO LV ESV INDEX A2C: 15.4 ML/M2
ECHO LV ESV INDEX A4C: 17.2 ML/M2
ECHO LV ESV INDEX BP: 16.6 ML/M2
ECHO LV GLOBAL LONGITUDINAL STRAIN (GLS): -19.7 PERCENT
ECHO LV INTERNAL DIMENSION DIASTOLIC: 4.6 CM (ref 4.2–5.9)
ECHO LV INTERNAL DIMENSION SYSTOLIC: 2.81 CM
ECHO LV IVSD: 0.92 CM (ref 0.6–1)
ECHO LV MASS 2D: 146 G (ref 88–224)
ECHO LV MASS INDEX 2D: 78.1 G/M2 (ref 49–115)
ECHO LV POSTERIOR WALL DIASTOLIC: 0.96 CM (ref 0.6–1)
ECHO LVOT DIAM: 2.01 CM
ECHO LVOT PEAK GRADIENT: 4.22 MMHG
ECHO LVOT PEAK VELOCITY: 102.69 CM/S
ECHO LVOT SV: 70 ML
ECHO LVOT VTI: 22.02 CM
ECHO MV A VELOCITY: 70.21 CM/S
ECHO MV AREA PHT: 3.21 CM2
ECHO MV E DECELERATION TIME (DT): 236.63 MS
ECHO MV E VELOCITY: 66.18 CM/S
ECHO MV E/A RATIO: 0.94
ECHO MV E/E' LATERAL: 7.1
ECHO MV E/E' RATIO (AVERAGED): 10.74
ECHO MV E/E' SEPTAL: 14.39
ECHO MV PRESSURE HALF TIME (PHT): 68.62 MS
ECHO RA AREA 4C: 15.27 CM2
ECHO RV INTERNAL DIMENSION: 3.49 CM
ECHO TV REGURGITANT MAX VELOCITY: 171.91 CM/S
ECHO TV REGURGITANT PEAK GRADIENT: 11.82 MMHG
GLOBAL LONGITUDINAL STRAIN 2 CHAMBER: -20.2 PERCENT
GLOBAL LONGITUDINAL STRAIN 4 CHAMBER: -19.5 PERCENT
GLOBAL LONGITUDINAL STRAIN LONG AXIS: -19.4 PERCENT
LA VOL DISK BP: 51.63 ML (ref 18–58)
STRESS ANGINA INDEX: 0
STRESS BASELINE DIAS BP: 70 MMHG
STRESS BASELINE HR: 54 BPM
STRESS BASELINE SYS BP: 110 MMHG
STRESS ESTIMATED WORKLOAD: 13.4 METS
STRESS EXERCISE DUR MIN: NORMAL
STRESS O2 SAT PEAK: 99 %
STRESS O2 SAT REST: 96 %
STRESS PEAK DIAS BP: 62 MMHG
STRESS PEAK SYS BP: 160 MMHG
STRESS PERCENT HR ACHIEVED: 84 %
STRESS POST PEAK HR: 127 BPM
STRESS RATE PRESSURE PRODUCT: NORMAL BPM*MMHG
STRESS ST DEPRESSION: 0 MM
STRESS ST ELEVATION: 0 MM
STRESS TARGET HR: 151 BPM

## 2021-05-18 PROCEDURE — 93018 CV STRESS TEST I&R ONLY: CPT | Performed by: INTERNAL MEDICINE

## 2021-05-18 PROCEDURE — 3017F COLORECTAL CA SCREEN DOC REV: CPT | Performed by: INTERNAL MEDICINE

## 2021-05-18 PROCEDURE — 1101F PT FALLS ASSESS-DOCD LE1/YR: CPT | Performed by: INTERNAL MEDICINE

## 2021-05-18 PROCEDURE — A9500 TC99M SESTAMIBI: HCPCS | Performed by: INTERNAL MEDICINE

## 2021-05-18 PROCEDURE — G8427 DOCREV CUR MEDS BY ELIG CLIN: HCPCS | Performed by: INTERNAL MEDICINE

## 2021-05-18 PROCEDURE — 93306 TTE W/DOPPLER COMPLETE: CPT | Performed by: INTERNAL MEDICINE

## 2021-05-18 PROCEDURE — 99214 OFFICE O/P EST MOD 30 MIN: CPT | Performed by: INTERNAL MEDICINE

## 2021-05-18 PROCEDURE — 78452 HT MUSCLE IMAGE SPECT MULT: CPT | Performed by: INTERNAL MEDICINE

## 2021-05-18 PROCEDURE — G8536 NO DOC ELDER MAL SCRN: HCPCS | Performed by: INTERNAL MEDICINE

## 2021-05-18 PROCEDURE — G0463 HOSPITAL OUTPT CLINIC VISIT: HCPCS | Performed by: INTERNAL MEDICINE

## 2021-05-18 PROCEDURE — G8510 SCR DEP NEG, NO PLAN REQD: HCPCS | Performed by: INTERNAL MEDICINE

## 2021-05-18 PROCEDURE — G8420 CALC BMI NORM PARAMETERS: HCPCS | Performed by: INTERNAL MEDICINE

## 2021-05-18 PROCEDURE — 93016 CV STRESS TEST SUPVJ ONLY: CPT | Performed by: INTERNAL MEDICINE

## 2021-05-18 RX ORDER — EFAVIRENZ, LAMIVUDINE AND TENOFOVIR DISOPROXIL FUMARATE 400; 300; 300 MG/1; MG/1; MG/1
1 TABLET, FILM COATED ORAL DAILY
COMMUNITY

## 2021-05-18 RX ORDER — TETRAKIS(2-METHOXYISOBUTYLISOCYANIDE)COPPER(I) TETRAFLUOROBORATE 1 MG/ML
10 INJECTION, POWDER, LYOPHILIZED, FOR SOLUTION INTRAVENOUS ONCE
Status: COMPLETED | OUTPATIENT
Start: 2021-05-18 | End: 2021-05-18

## 2021-05-18 RX ORDER — TETRAKIS(2-METHOXYISOBUTYLISOCYANIDE)COPPER(I) TETRAFLUOROBORATE 1 MG/ML
30 INJECTION, POWDER, LYOPHILIZED, FOR SOLUTION INTRAVENOUS ONCE
Status: COMPLETED | OUTPATIENT
Start: 2021-05-18 | End: 2021-05-18

## 2021-05-18 RX ADMIN — TECHNETIUM TC 99M SESTAMIBI 25.7 MILLICURIE: 1 INJECTION INTRAVENOUS at 11:45

## 2021-05-18 RX ADMIN — TETRAKIS(2-METHOXYISOBUTYLISOCYANIDE)COPPER(I) TETRAFLUOROBORATE 8.5 MILLICURIE: 1 INJECTION, POWDER, LYOPHILIZED, FOR SOLUTION INTRAVENOUS at 10:20

## 2021-05-18 NOTE — PROGRESS NOTES
[facility]: Bobby Dow  [phone]  Pronounced \"sir-lakesha\"    HPI: Herve Jones, a 72y.o. year-old who presents for follow up regarding his CAD. He is feeling good, walking 7 days a week and feeling well with it. The drop in EF tofdya is down a bit. He feels liek he is getting tired but it hasn't been anything alarming. Labs look fantastic on the repatha and ldl way down. LDLp down to 536 from 1360  A1c was good     He is exercising daily, doing well, fells well and not chest pain and no dyspnea, eating ok, anxiety and depression are much better than they were, doing meditation and that is helping a lot too  Used to take fish oil, changed to flaxseed and using that daily. Mitra Chi is doing well. LDL at goal no side effects    He built a care home home on [facility], enjoying it. He is having trouble with anxiety and depression. Wokring with counseling and meds. No LE edema  Still with some chest discomfort /postop pain that is stable, no change, like a tenderness in the anterior chest.     So today he completed nuclear stress test to look for CAD progression however even though the nuclear imaging showed no evidence of ischemia his ejection fraction appeared to have dropped abnormally low for him to 44%. We discussed how the nuclear imaging EF calculation can be compromised by irregular heartbeats and ectopy so he stayed to have an echo to reevaluate his ejection fraction. The great news is that the echo showed normal heart function and improvement in his valvular heart disease. So no additional medications are needed today he will continue on the Repatha Zetia and Livalo for cholesterol control continue on aspirin for his CAD and follow-up in a year    Assessment/Plan:  1. CAD- stable, 3 vessel CABG x 3 LIMA to LAD, RSVG to OM, RSVG to PDA 6/4/13 Dr. Branden Dietz, last stress test looked fine 2018  -continue on ASA and Livalo, no beta blocker secondary to dizziness/bradycardia  2. Dyslipidemia- , LDLp 1413 on Livalo 4mg dailly and Zetia 10mg daily   - FDA recommends Livalvo (FDA recommendations scanned into [facility]) for lipid therapy with concurrent HIV therapy  - discussed PCSK9 inhibitors, will research as above  3. NSTEMI- no ACEI/bblocker secondary to hypotension/dizziness, continue ASA and statin  4. HIV- followed by Dr. Laxmi Humphrey, on Atripla, stable  5. DVT- at GSV harvest site 7/13, gone on ultrasound after 6 months of coumadin therapy. 6. Valvular heart disease- mild MR/TR/PI, stable  7. Hypotension- stable now stay hydrated, etc    7/15 EF 60% mild TR/MR/PI  8/15 Nuc normal EF 60%  Venous duplex 7/10/13: DVT in GSV bilaterally, in the right extending into the CFV  CABG 6/4/13 - LIMA to LAD, RSVG to OM, RSVG to PDA  TTE 5/13 - LVEF 45 %, dyskinesis of the basal-mid anteroseptal and apical wall(s), trivial TR    Soc no tob no etoh  Fhx no early cad    He  has a past medical history of CAD (coronary artery disease), GERD (gastroesophageal reflux disease), Hiatal hernia, HIV (human immunodeficiency virus infection) ([address] and Hyperlipidemia. Cardiovascular ROS: negative for chest pain, dyspnea  Respiratory ROS: negative for - cough or hemoptysis  Neurological ROS: no TIA or stroke symptoms  All other systems negative except as above. PE  Vitals:    05/18/21 1205   BP: 110/70   Pulse: 77   SpO2: 99%   Height: 5' 8\" (1.727 m)    Body mass index is 24.63 kg/m².    General appearance - alert, well appearing, and in no distress  Mental status - affect appropriate to mood  Eyes - sclera anicteric, moist mucous membranes  Neck - supple, no significant adenopathy  Lymphatics - not assessed   Chest - clear to auscultation, no wheezes, rales or rhonchi  Heart - normal rate, regular rhythm, normal S1, S2, 2/6 KESHAWN   Abdomen - soft, nontender, nondistended, no masses or organomegaly  Back exam - full range of motion, no tenderness  Neurological - cranial nerves II through XII grossly intact, no focal deficit  Musculoskeletal - no muscular tenderness noted, normal strength  Extremities - peripheral pulses normal, no pedal edema  Skin - normal coloration  no rashes    12 lead ECG: sinus bradycardia VR 58bpm     Recent Labs:  Lab Results   Component Value Date/Time    Cholesterol, total 176 01/11/2016 09:37 AM    Cholesterol, Total 118 05/06/2021 11:27 AM    HDL Cholesterol 47 01/11/2016 09:37 AM    LDL, calculated 103 (H) 01/11/2016 09:37 AM    Triglyceride 130 01/11/2016 09:37 AM    CHOL/HDL Ratio 6.6 (H) 05/30/2013 04:00 AM     Lab Results   Component Value Date/Time    Creatinine 1.12 05/06/2021 11:27 AM     Lab Results   Component Value Date/Time    BUN 21 05/06/2021 11:27 AM     Lab Results   Component Value Date/Time    Potassium 4.2 05/06/2021 11:27 AM     Lab Results   Component Value Date/Time    Hemoglobin A1c 5.4 03/12/2018 11:44 AM     Lab Results   Component Value Date/Time    HGB 13.8 04/24/2020 11:11 AM     Lab Results   Component Value Date/Time    PLATELET 048 09/00/3578 11:11 AM       Reviewed:  Past Medical History:   Diagnosis Date    CAD (coronary artery disease)     GERD (gastroesophageal reflux disease)     Hiatal hernia     [facility] ([facility]) [facility]     Hyperlipidemia      Social History     Tobacco Use   Smoking Status Never Smoker   Smokeless Tobacco Never Used     Social History     Substance and Sexual Activity   Alcohol Use Yes    Alcohol/week: 0.0 standard drinks    Frequency: 4 or more times a week    Drinks per session: 1 or 2    Comment: Moderate use     No Known Allergies    Current Outpatient Medications   Medication Sig    efavirenz-Lamivu-tenofov disop (Symfi Lo) 400-300-300 mg tab Take 1 Tab by mouth daily.     Repatha SureClick pen injection ADMINISTER SUBCUTANEOUSLY EVERY 2 WEEKS AS DIRECTED    ezetimibe (ZETIA) 10 mg tablet TAKE 1 TABLET DAILY    escitalopram oxalate (LEXAPRO) 20 mg tablet     ashwagandha root extract 300 mg cap Take  by mouth.  Livalo 4 mg tab tablet TAKE 1 TABLET DAILY    cholecalciferol, vitamin D3, (Vitamin D3) 50 mcg (2,000 unit) tab Take  by mouth.  buPROPion XL (WELLBUTRIN XL) 300 mg XL tablet Take 1 Tab by mouth daily.  aspirin 81 mg chewable tablet Take 1 tablet by mouth daily.  fexofenadine (ALLEGRA) 180 mg tablet Take 180 mg by mouth daily as needed.  albuterol (PROVENTIL HFA) 90 mcg/actuation inhaler Take 1 Puff by inhalation daily as needed.  busPIRone (BUSPAR) 10 mg tablet     efavirenz-emtrictabine-tenofovir (ATRIPLA) tablet Take 1 Tab by mouth nightly. No current facility-administered medications for this visit.       [facility] [place]  [address], [address]

## 2021-06-22 ENCOUNTER — HOSPITAL ENCOUNTER (EMERGENCY)
Age: 71
Discharge: HOME | End: 2021-06-22
Attending: EMERGENCY MEDICINE
Payer: MEDICARE

## 2021-06-22 ENCOUNTER — APPOINTMENT (OUTPATIENT)
Dept: GENERAL RADIOLOGY | Age: 71
End: 2021-06-22
Attending: EMERGENCY MEDICINE
Payer: MEDICARE

## 2021-06-22 ENCOUNTER — APPOINTMENT (OUTPATIENT)
Dept: CT IMAGING | Age: 71
End: 2021-06-22
Attending: EMERGENCY MEDICINE
Payer: MEDICARE

## 2021-06-22 VITALS
DIASTOLIC BLOOD PRESSURE: 88 MMHG | BODY MASS INDEX: 27.22 KG/M2 | SYSTOLIC BLOOD PRESSURE: 130 MMHG | WEIGHT: 179.01 LBS | TEMPERATURE: 98.2 F | HEART RATE: 58 BPM | OXYGEN SATURATION: 94 % | RESPIRATION RATE: 18 BRPM

## 2021-06-22 DIAGNOSIS — S52.502A CLOSED FRACTURE OF DISTAL END OF LEFT RADIUS, UNSPECIFIED FRACTURE MORPHOLOGY, INITIAL ENCOUNTER: Primary | ICD-10-CM

## 2021-06-22 PROCEDURE — 73080 X-RAY EXAM OF ELBOW: CPT

## 2021-06-22 PROCEDURE — 71046 X-RAY EXAM CHEST 2 VIEWS: CPT

## 2021-06-22 PROCEDURE — 73110 X-RAY EXAM OF WRIST: CPT

## 2021-06-22 PROCEDURE — 99284 EMERGENCY DEPT VISIT MOD MDM: CPT

## 2021-06-22 PROCEDURE — 72125 CT NECK SPINE W/O DYE: CPT

## 2021-06-22 PROCEDURE — 70450 CT HEAD/BRAIN W/O DYE: CPT

## 2021-06-22 PROCEDURE — 75810000053 HC SPLINT APPLICATION

## 2021-06-22 RX ORDER — HYDROCODONE BITARTRATE AND ACETAMINOPHEN 5; 325 MG/1; MG/1
1 TABLET ORAL
Qty: 12 TABLET | Refills: 0 | Status: SHIPPED | OUTPATIENT
Start: 2021-06-22 | End: 2021-06-25

## 2021-06-22 NOTE — ED TRIAGE NOTES
TRIAGE NOTE: Walked dog last night, got startled and fell landing on asphalt. C/o left wrist pain, scalp swelling, left rib cage pain. Hematoma to scalp, scrapes to left elbow. Ice, two ibuprofen last night. No anticoagulants.

## 2021-06-22 NOTE — ED NOTES
Patient discharged with vital signs stable, AVS and E prescriptions, in no acute distress. Instructed on care of splint, verbalized understanding.

## 2021-06-22 NOTE — ED PROVIDER NOTES
52-year-old male presents with a chief complaint of left wrist pain. Patient was walking his dog last night when he encountered a snake. He jumped backwards and tripped. He did hit the left side of his forehead. He also landed on his left elbow and wrist.  He denies any loss of consciousness. He denies neck or back pain currently. He did take Advil with some relief. He denies any other symptoms. Past Medical History:   Diagnosis Date    CAD (coronary artery disease)     GERD (gastroesophageal reflux disease)     Hiatal hernia     HIV (human immunodeficiency virus infection) ([facility] [address]     Hyperlipidemia        Past Surgical History:   Procedure Laterality Date    UT CARDIAC SURG PROCEDURE UNLIST  6/4/2013    CABG         Family History:   Problem Relation Age of Onset    Cancer Mother     Heart Disease Mother     Lung Disease Father     Alcohol abuse Brother        Social History     Socioeconomic History    Marital status:      Spouse name: Not on file    Number of children: Not on file    Years of education: Not on file    Highest education level: Not on file   Occupational History    Not on file   Tobacco Use    Smoking status: Never Smoker    Smokeless tobacco: Never Used   Substance and Sexual Activity    Alcohol use: Yes     Alcohol/week: 0.0 standard drinks     Comment: Moderate use    Drug use: No    Sexual activity: Not on file   Other Topics Concern    Not on file   Social History Narrative    Not on file     Social Determinants of Health     Financial Resource Strain:     Difficulty of Paying Living Expenses:    Food Insecurity:     Worried About Running Out of Food in the Last Year:     [address] in the Last Year:    Transportation Needs:     Lack of Transportation (Medical):      Lack of Transportation (Non-Medical):    Physical Activity:     Days of Exercise per Week:     Minutes of Exercise per Session:    Stress:     Feeling of Stress :    Social Connections:     Frequency of Communication with Friends and Family:     Frequency of Social Gatherings with Friends and Family:     Attends Buddhist Services:     Active Member of Clubs or Organizations:     Attends Club or Organization Meetings:     Marital Status:    Intimate Partner Violence:     Fear of Current or Ex-Partner:     Emotionally Abused:     Physically Abused:     Sexually Abused: ALLERGIES: Patient has no known allergies. Review of Systems   Constitutional: Negative for fever. HENT: Negative for rhinorrhea. Respiratory: Negative for cough. Cardiovascular: Negative for chest pain. Gastrointestinal: Negative for abdominal pain. Genitourinary: Negative for dysuria. Musculoskeletal: Positive for arthralgias. Negative for back pain. Skin: Positive for wound. Neurological: Negative for headaches. Psychiatric/Behavioral: Negative for confusion. Vitals:    06/22/21 1313   BP: (!) 146/88   Pulse: (!) 58   Resp: 18   Temp: 98.2 °F (36.8 °C)   SpO2: 97%   Weight: 81.2 kg (179 lb 0.2 oz)            Physical Exam  Vitals and nursing note reviewed. Constitutional:       General: He is not in acute distress. Appearance: Normal appearance. He is not ill-appearing, toxic-appearing or diaphoretic. HENT:      Head: Normocephalic. Comments: Left forehead contusion  Eyes:      Extraocular Movements: Extraocular movements intact. Cardiovascular:      Rate and Rhythm: Normal rate. Pulses: Normal pulses. Heart sounds: Normal heart sounds. No murmur heard. No gallop. Pulmonary:      Effort: Pulmonary effort is normal. No respiratory distress. Breath sounds: Normal breath sounds. No wheezing or rales. Abdominal:      General: Abdomen is flat. Bowel sounds are normal. There is no distension. Palpations: Abdomen is soft. Tenderness: There is no abdominal tenderness. There is no guarding.    Musculoskeletal:         General: Normal range of motion. Cervical back: Normal range of motion. Comments: Swelling to the left wrist and elbow    Skin:     General: Skin is dry. Capillary Refill: Capillary refill takes less than 2 seconds. Neurological:      Mental Status: He is alert and oriented to person, place, and time. Psychiatric:         Mood and Affect: Mood normal.          MDM  Number of Diagnoses or Management Options  Closed fracture of distal end of left radius, unspecified fracture morphology, initial encounter  Diagnosis management comments: 73-year-old male presents with a forehead contusion and left wrist pain after a fall last night. The left wrist is significantly swollen. Plain film imaging and CT imaging was obtained to rule out traumatic injury. X-ray of the left wrist demonstrates cortical defect concerning for an acute fracture. Patient placed in a splint and sling. He will follow up with orthopedic surgery. The remainder of his imaging is unremarkable. Discussed my clinical impression(s), any labs and/or radiology results with the patient. I answered any questions and addressed any concerns. Discussed the importance of following up with their primary care physician and/or specialist(s). Discussed signs or symptoms that would warrant return back to the ER for further evaluation. The patient is agreeable with discharge.        Amount and/or Complexity of Data Reviewed  Tests in the radiology section of CPT®: ordered and reviewed           Procedures

## 2021-07-29 ENCOUNTER — TELEPHONE (OUTPATIENT)
Dept: CARDIOLOGY CLINIC | Age: 71
End: 2021-07-29

## 2021-07-29 RX ORDER — EVOLOCUMAB 140 MG/ML
INJECTION, SOLUTION SUBCUTANEOUS
Qty: 3 ML | Refills: 1 | Status: SHIPPED | OUTPATIENT
Start: 2021-07-29 | End: 2021-07-30

## 2021-07-29 RX ORDER — EVOLOCUMAB 140 MG/ML
INJECTION, SOLUTION SUBCUTANEOUS
Qty: 3 ML | Refills: 0 | Status: SHIPPED | OUTPATIENT
Start: 2021-07-29 | End: 2021-07-30

## 2021-07-30 RX ORDER — EVOLOCUMAB 140 MG/ML
140 INJECTION, SOLUTION SUBCUTANEOUS
COMMUNITY
End: 2021-08-09

## 2021-07-30 NOTE — TELEPHONE ENCOUNTER
Returned pharmacy call, 2 pt identifiers used    Clarified patient has been using Repatha 140 mg 1 injection every 2 weeks. Continue same, no changes.

## 2021-07-30 NOTE — TELEPHONE ENCOUNTER
[address] , calling for clarification of directions on Repatha      [phone] ask for [facility]LA[facility]

## 2021-08-02 RX ORDER — EVOLOCUMAB 140 MG/ML
INJECTION, SOLUTION SUBCUTANEOUS
Qty: 2 ML | Refills: 3 | Status: SHIPPED | OUTPATIENT
Start: 2021-08-02 | End: 2021-08-07 | Stop reason: SDUPTHER

## 2021-08-09 RX ORDER — EVOLOCUMAB 140 MG/ML
140 INJECTION, SOLUTION SUBCUTANEOUS
Qty: 6 ML | Refills: 1 | Status: SHIPPED | OUTPATIENT
Start: 2021-08-09

## 2021-08-09 NOTE — TELEPHONE ENCOUNTER
Requested Prescriptions     Signed Prescriptions Disp Refills    evolocumab (Repatha SureClick) pen injection 6 mL 1     Si mL by SubCUTAneous route every fourteen (14) days.      Authorizing Provider: Darien Maloney     Ordering User: Cherelle Saunders     Per verbal orders

## 2021-08-24 RX ORDER — PITAVASTATIN CALCIUM 4.18 MG/1
TABLET, FILM COATED ORAL
Qty: 90 TABLET | Refills: 1 | Status: SHIPPED | OUTPATIENT
Start: 2021-08-24

## 2021-08-24 NOTE — TELEPHONE ENCOUNTER
Requested Prescriptions     Signed Prescriptions Disp Refills    Livalo 4 mg tab tablet 90 Tablet 1     Sig: TAKE 1 TABLET DAILY     Authorizing Provider: Marilyn Call     Ordering User: Kal Feliz     Per verbal orders

## 2022-02-10 ENCOUNTER — TELEPHONE (OUTPATIENT)
Dept: CARDIOLOGY CLINIC | Age: 72
End: 2022-02-10

## 2022-02-10 NOTE — TELEPHONE ENCOUNTER
Verified patient with two patient identifiers. Spoke with patient regarding refill for Repatha. Per patient she will continue to see  at her new practice. Refill faxed to [phone].

## 2022-03-07 NOTE — MR AVS SNAPSHOT
[address] [facility] [address] [place] 
[phone] Patient: Lisa Kathleen MRN: [id number] Visit Information Date & Time Provider Department Dept. Phone Encounter #  
 4/12/2018 11:20 AM Ani Koroma MD [facility] [phone] [id number] Your Appointments 4/23/2018 12:00 PM  
NUCLEAR MEDICINE with VIRAJ FANG [facility] ([place] SCHEDULING) Appt Note: 1 day exercise nuclear for CAD ht 5'8 wt 150 12:00 echo for MRTR 2:00 per Dr. Juwan Nieto [address] [place]  
[phone]  
  
   
 [address] [facility] [address]  
  
    
 4/23/2018  2:00 PM  
ECHO CARDIOGRAMS 2D with Teresa Salinas [facility] ([place] SCHEDULING) Appt Note: 1 day exercise nuclear for CAD ht 5'8 wt 150 12:00 echo for MRTR 2:00 per Dr. Juwan Nieto [address] [facility] [address]  
[address] [facility] [address]  
  
    
 4/25/2019 11:40 AM  
ESTABLISHED PATIENT with Ani Koroma MD  
[facility] ([facility]) Appt Note: 1 yr f/u per Dr. Juwan Nieto [address] [place]  
[address] [address] Upcoming Health Maintenance Date Due Hepatitis C Screening 1950 DTaP/Tdap/Td series (1 - Tdap) 9/22/1972 FOBT Q 1 YEAR AGE 50-75 9/22/2001 ZOSTER VACCINE AGE 60> 7/22/2011 GLAUCOMA SCREENING Q2Y 9/22/2016 Pneumococcal 65+ High/Highest Risk (2 of 2 - PPSV23) 9/22/2016 Influenza Age 5 to Adult 8/1/2017 MEDICARE YEARLY EXAM 3/14/2018 Allergies as of 4/12/2018  Review Complete On: 4/12/2018 By: Emily Vaca No Known Allergies Current Immunizations  Reviewed on 6/24/2013 [FreeTextEntry1] : Alopecia areata\par Long standing without change.\par Doubt tx would be effective at this time.\par There is sparse growth, but lack of follicles.\par Education - extensive.  Follow.\par \par Folliculitis - posterior scalp and posterior neck.\par Education - extensive.\par Clindamycin solution bid  prn.\par \par Probable contact dermatitis of the breasts, abdomen.\par ? some intertrigo of the abdomen.\par Diffuse pruritus of the upper arm and thighs.\par Recommend:\par  - lidex gel bid x 2 weeks.\par  - keep abdominal fold dry\par  - avoid all other topicals for now.\par  - atarax 25mg qhs\par f/u in 2-3 weeks. Name Date Influenza Vaccine 10/1/2012 Pneumococcal Vaccine (Unspecified Type) 6/1/2006 Not reviewed this visit You Were Diagnosed With   
  
 Codes Comments Atherosclerosis of native coronary artery of native heart without angina pectoris    -  Primary ICD-10-CM: I25.10 ICD-9-CM: 414.01 Tricuspid valve insufficiency, unspecified etiology     ICD-10-CM: I07.1 ICD-9-CM: 397.0 Dyslipidemia, goal LDL below 70     ICD-10-CM: E78.5 ICD-9-CM: 272.4 S/P CABG x 3     ICD-10-CM: Z95.1 ICD-9-CM: V45.81 Non-rheumatic mitral regurgitation     ICD-10-CM: I34.0 ICD-9-CM: 424.0 Vitals BP Pulse Resp Height(growth percentile) Weight(growth percentile) BMI  
 120/74 (BP 1 Location: Left arm, BP Patient Position: Sitting) (!) 58 16 5' 8\" (1.727 m) 150 lb 6.4 oz (68.2 kg) 22.87 kg/m2 Smoking Status Never Smoker Vitals History BMI and BSA Data Body Mass Index Body Surface Area  
 22.87 kg/m 2 1.81 m 2 Preferred Pharmacy Pharmacy Name Phone [facility], [facility] [phone] Your Updated Medication List  
  
   
This list is accurate as of 4/12/18 12:13 PM.  Always use your most recent med list. ALLEGRA 180 mg tablet Generic drug:  fexofenadine Take 180 mg by mouth daily as needed. aspirin 81 mg chewable tablet Take 1 tablet by mouth daily. ATRIPLA tablet Generic drug:  efavirenz-emtrictabine-tenofovir Take 1 Tab by mouth nightly. calcium 500 mg Tab Take 1 Tab by mouth daily. cetirizine 10 mg tablet Commonly known as:  ZYRTEC Take  by mouth daily as needed. ezetimibe 10 mg tablet Commonly known as:  Colie Morgans TAKE 1 TABLET DAILY LIVALO 4 mg Tab tablet Generic drug:  pitavastatin calcium TAKE 1 TABLET DAILY  
  
 multivitamin Liqd Commonly known as:  Austin Mask Take 5 mL by mouth daily. PROVENTIL HFA 90 mcg/actuation inhaler Generic drug:  albuterol Take 1 Puff by inhalation daily as needed. We Performed the Following AMB POC EKG ROUTINE W/ 12 LEADS, INTER & REP [64881 CPT(R)] To-Do List   
 04/12/2018 ECHO:  2D ECHO COMPLETE ADULT (TTE) W OR WO CONTR   
  
 04/12/2018 Imaging:  NM CARDIAC SPECT W STRS/REST MULT   
  
 04/12/2018 ECG:  NUCLEAR STRESS TEST Introducing [facility]! Dear Jazmyn Eagle: Thank you for requesting a [employer] account. Our records indicate that you already have an active Kalion account. You can access your account anytime at [email or web address] Did you know that you can access your hospital and ER discharge instructions at any time in Kalion? You can also review all of your test results from your hospital stay or ER visit. Additional Information If you have questions, please visit the Frequently Asked Questions section of the Kalion website at [email or web address] Remember, Kalion is NOT to be used for urgent needs. For medical emergencies, dial 911. Now available from your iPhone and Android! Please provide this summary of care documentation to your next provider. Your primary care clinician is listed as Meek Pratt. If you have any questions after today's visit, please call [phone].

## 2024-02-29 ENCOUNTER — TRANSCRIBE ORDERS (OUTPATIENT)
Facility: HOSPITAL | Age: 74
End: 2024-02-29

## 2024-02-29 ENCOUNTER — HOSPITAL ENCOUNTER (OUTPATIENT)
Facility: HOSPITAL | Age: 74
Discharge: HOME | End: 2024-02-29
Attending: INTERNAL MEDICINE
Payer: MEDICARE

## 2024-02-29 DIAGNOSIS — M79.644 PAIN IN FINGER OF RIGHT HAND: ICD-10-CM

## 2024-02-29 DIAGNOSIS — M79.642 LEFT HAND PAIN: ICD-10-CM

## 2024-02-29 DIAGNOSIS — M79.642 LEFT HAND PAIN: Primary | ICD-10-CM

## 2024-02-29 PROCEDURE — 73130 X-RAY EXAM OF HAND: CPT

## 2024-05-02 ENCOUNTER — HOSPITAL ENCOUNTER (EMERGENCY)
Facility: HOSPITAL | Age: 74
Discharge: HOME | End: 2024-05-02
Attending: EMERGENCY MEDICINE
Payer: MEDICARE

## 2024-05-02 VITALS
RESPIRATION RATE: 16 BRPM | OXYGEN SATURATION: 94 % | WEIGHT: 191.14 LBS | DIASTOLIC BLOOD PRESSURE: 92 MMHG | HEART RATE: 54 BPM | BODY MASS INDEX: 30 KG/M2 | TEMPERATURE: 97.5 F | SYSTOLIC BLOOD PRESSURE: 154 MMHG | HEIGHT: 67 IN

## 2024-05-02 DIAGNOSIS — S61.012A LACERATION OF LEFT THUMB WITHOUT FOREIGN BODY WITHOUT DAMAGE TO NAIL, INITIAL ENCOUNTER: Primary | ICD-10-CM

## 2024-05-02 PROCEDURE — 99283 EMERGENCY DEPT VISIT LOW MDM: CPT

## 2024-05-02 PROCEDURE — 6370000000 HC RX 637 (ALT 250 FOR IP)

## 2024-05-02 RX ORDER — CEPHALEXIN 500 MG/1
500 CAPSULE ORAL 3 TIMES DAILY
Qty: 21 CAPSULE | Refills: 0 | Status: SHIPPED | OUTPATIENT
Start: 2024-05-02 | End: 2024-05-09

## 2024-05-02 RX ORDER — GINSENG 100 MG
CAPSULE ORAL
Status: COMPLETED | OUTPATIENT
Start: 2024-05-02 | End: 2024-05-02

## 2024-05-02 RX ADMIN — BACITRACIN 1 EACH: 500 OINTMENT TOPICAL at 12:52

## 2024-05-02 ASSESSMENT — PAIN DESCRIPTION - LOCATION: LOCATION: FINGER (COMMENT WHICH ONE)

## 2024-05-02 ASSESSMENT — PAIN SCALES - GENERAL: PAINLEVEL_OUTOF10: 1

## 2024-05-02 ASSESSMENT — PAIN DESCRIPTION - ORIENTATION: ORIENTATION: LEFT

## 2024-05-02 ASSESSMENT — ENCOUNTER SYMPTOMS: ROS SKIN COMMENTS: LACERATION TO LEFT THUMB

## 2024-05-02 NOTE — DISCHARGE INSTRUCTIONS
Take ibuprofen and tylenol as needed for pain. Make sure to take ibuprofen with food.     Keep wound clean and dry for the next 24 hours. After 24 hours, you may remove the dressing and apply bacitracin ointment to help with wound healing.     Monitor for signs of infection like increased redness, swelling, drainage, and new fevers. Return to the nearest emergency department if this occurs.     Follow-up closely with your primary care provider for wound reevaluation in 2-3 days. Call for appointment as soon as possible.

## 2024-05-02 NOTE — ED PROVIDER NOTES
[facility]  EMERGENCY DEPARTMENT ENCOUNTER      Pt Name: Duane C Sergent  MRN: [id number]  Birthdate 1950  Date of evaluation: 5/2/2024  Provider: JAVIER Adams NP    CHIEF COMPLAINT       Chief Complaint   Patient presents with    Laceration         HISTORY OF PRESENT ILLNESS   (Location/Symptom, Timing/Onset, Context/Setting, Quality, Duration, Modifying Factors, Severity)  Note limiting factors.   Duane C Sergent is a 73 y.o. male presenting to the ED c/o laceration to left thumb after slicing bread since yesterday 19:30 PM. Pt reports UTD w/ tetanus vaccine within 5 years. Pt reports using ASA but denies other blood thinner use. Pt reports living far away so could not get to a medical facility until now. Pt reports taking tylenol and ibuprofen which helped with the pain. Pt reports cleansing skin with antiseptic and antibiotic ointment. Bleeding controlled at this time.         The history is provided by the patient. No  was used.         Review of External Medical Records:     Nursing Notes were reviewed.    REVIEW OF SYSTEMS    (2-9 systems for level 4, 10 or more for level 5)     Review of Systems   Constitutional:  Negative for activity change and appetite change.   Musculoskeletal:  Negative for myalgias.   Skin:  Positive for wound. Negative for rash.        Laceration to left thumb   All other systems reviewed and are negative.      Except as noted above the remainder of the review of systems was reviewed and negative.       PAST MEDICAL HISTORY     Past Medical History:   Diagnosis Date    CAD (coronary artery disease)     GERD (gastroesophageal reflux disease)     Hiatal hernia     HIV (human immunodeficiency virus infection) (HCC)     Hyperlipidemia          SURGICAL HISTORY       Past Surgical History:   Procedure Laterality Date    MS UNLISTED PROCEDURE CARDIAC SURGERY  6/4/2013    CABG         CURRENT MEDICATIONS       Previous Medications    ALBUTEROL

## 2024-05-02 NOTE — ED TRIAGE NOTES
ED triage note: Ambulatory with a steady gait. Patient reports around 1930 last night was slicing a loaf of bread, the knife slipped and sliced left thumb. Denies blood thinners. States takes 81 mg aspirin.